# Patient Record
Sex: MALE | Race: WHITE | NOT HISPANIC OR LATINO | Employment: OTHER | URBAN - METROPOLITAN AREA
[De-identification: names, ages, dates, MRNs, and addresses within clinical notes are randomized per-mention and may not be internally consistent; named-entity substitution may affect disease eponyms.]

---

## 2017-01-03 ENCOUNTER — CLINICAL SUPPORT (OUTPATIENT)
Dept: INTERNAL MEDICINE | Facility: CLINIC | Age: 77
End: 2017-01-03
Payer: MEDICARE

## 2017-01-03 DIAGNOSIS — E78.5 HYPERLIPIDEMIA: ICD-10-CM

## 2017-01-03 DIAGNOSIS — E03.4 HYPOTHYROIDISM DUE TO ACQUIRED ATROPHY OF THYROID: ICD-10-CM

## 2017-01-03 DIAGNOSIS — E78.5 HYPERLIPIDEMIA, UNSPECIFIED HYPERLIPIDEMIA TYPE: ICD-10-CM

## 2017-01-03 LAB
ALBUMIN SERPL BCP-MCNC: 4 G/DL
ALP SERPL-CCNC: 53 U/L
ALT SERPL W/O P-5'-P-CCNC: 17 U/L
ANION GAP SERPL CALC-SCNC: 7 MMOL/L
AST SERPL-CCNC: 24 U/L
BASOPHILS # BLD AUTO: 0.03 K/UL
BASOPHILS NFR BLD: 0.5 %
BILIRUB SERPL-MCNC: 0.8 MG/DL
BUN SERPL-MCNC: 15 MG/DL
CALCIUM SERPL-MCNC: 9.2 MG/DL
CHLORIDE SERPL-SCNC: 107 MMOL/L
CHOLEST/HDLC SERPL: 6.2 {RATIO}
CO2 SERPL-SCNC: 26 MMOL/L
CREAT SERPL-MCNC: 1.1 MG/DL
DIFFERENTIAL METHOD: NORMAL
EOSINOPHIL # BLD AUTO: 0.2 K/UL
EOSINOPHIL NFR BLD: 3.5 %
ERYTHROCYTE [DISTWIDTH] IN BLOOD BY AUTOMATED COUNT: 14.3 %
EST. GFR  (AFRICAN AMERICAN): >60 ML/MIN/1.73 M^2
EST. GFR  (NON AFRICAN AMERICAN): >60 ML/MIN/1.73 M^2
GLUCOSE SERPL-MCNC: 102 MG/DL
HCT VFR BLD AUTO: 47.4 %
HDL/CHOLESTEROL RATIO: 16.2 %
HDLC SERPL-MCNC: 235 MG/DL
HDLC SERPL-MCNC: 38 MG/DL
HGB BLD-MCNC: 16.5 G/DL
LDLC SERPL CALC-MCNC: 159.2 MG/DL
LYMPHOCYTES # BLD AUTO: 2.1 K/UL
LYMPHOCYTES NFR BLD: 34.8 %
MCH RBC QN AUTO: 29.4 PG
MCHC RBC AUTO-ENTMCNC: 34.8 %
MCV RBC AUTO: 85 FL
MONOCYTES # BLD AUTO: 0.5 K/UL
MONOCYTES NFR BLD: 7.9 %
NEUTROPHILS # BLD AUTO: 3.2 K/UL
NEUTROPHILS NFR BLD: 53.3 %
NONHDLC SERPL-MCNC: 197 MG/DL
PLATELET # BLD AUTO: 229 K/UL
PMV BLD AUTO: 9.2 FL
POTASSIUM SERPL-SCNC: 4.5 MMOL/L
PROT SERPL-MCNC: 7.8 G/DL
RBC # BLD AUTO: 5.61 M/UL
SODIUM SERPL-SCNC: 140 MMOL/L
T4 FREE SERPL-MCNC: 0.85 NG/DL
TRIGL SERPL-MCNC: 189 MG/DL
TSH SERPL DL<=0.005 MIU/L-ACNC: 5.47 UIU/ML
WBC # BLD AUTO: 5.95 K/UL

## 2017-01-03 PROCEDURE — 85025 COMPLETE CBC W/AUTO DIFF WBC: CPT

## 2017-01-03 PROCEDURE — 84439 ASSAY OF FREE THYROXINE: CPT

## 2017-01-03 PROCEDURE — 80061 LIPID PANEL: CPT

## 2017-01-03 PROCEDURE — 80053 COMPREHEN METABOLIC PANEL: CPT

## 2017-01-03 PROCEDURE — 36415 COLL VENOUS BLD VENIPUNCTURE: CPT | Mod: PBBFAC

## 2017-01-03 PROCEDURE — 84443 ASSAY THYROID STIM HORMONE: CPT

## 2017-01-10 ENCOUNTER — OFFICE VISIT (OUTPATIENT)
Dept: INTERNAL MEDICINE | Facility: CLINIC | Age: 77
End: 2017-01-10
Payer: MEDICARE

## 2017-01-10 VITALS
SYSTOLIC BLOOD PRESSURE: 124 MMHG | OXYGEN SATURATION: 95 % | RESPIRATION RATE: 14 BRPM | WEIGHT: 145.06 LBS | HEART RATE: 65 BPM | DIASTOLIC BLOOD PRESSURE: 62 MMHG | BODY MASS INDEX: 21.99 KG/M2 | HEIGHT: 68 IN

## 2017-01-10 DIAGNOSIS — E78.5 HYPERLIPIDEMIA, UNSPECIFIED HYPERLIPIDEMIA TYPE: ICD-10-CM

## 2017-01-10 DIAGNOSIS — E03.4 HYPOTHYROIDISM DUE TO ACQUIRED ATROPHY OF THYROID: Primary | ICD-10-CM

## 2017-01-10 DIAGNOSIS — Z12.5 SPECIAL SCREENING FOR MALIGNANT NEOPLASM OF PROSTATE: ICD-10-CM

## 2017-01-10 DIAGNOSIS — Z29.9 PREVENTIVE MEASURE: ICD-10-CM

## 2017-01-10 PROCEDURE — 90670 PCV13 VACCINE IM: CPT | Mod: PBBFAC | Performed by: INTERNAL MEDICINE

## 2017-01-10 PROCEDURE — 99214 OFFICE O/P EST MOD 30 MIN: CPT | Mod: S$PBB,,, | Performed by: INTERNAL MEDICINE

## 2017-01-10 PROCEDURE — 99999 PR PBB SHADOW E&M-EST. PATIENT-LVL III: CPT | Mod: PBBFAC,,, | Performed by: INTERNAL MEDICINE

## 2017-01-10 PROCEDURE — 99213 OFFICE O/P EST LOW 20 MIN: CPT | Mod: PBBFAC | Performed by: INTERNAL MEDICINE

## 2017-01-10 RX ORDER — LEVOTHYROXINE SODIUM 50 UG/1
50 TABLET ORAL DAILY
Qty: 30 TABLET | Refills: 11 | Status: SHIPPED | OUTPATIENT
Start: 2017-01-10 | End: 2017-07-12 | Stop reason: SDUPTHER

## 2017-01-10 NOTE — MR AVS SNAPSHOT
[place] [facility]  [address] [facility] [address]  Phone: [phone]  Fax: [phone]                  Pawan Cantrell   1/10/2017 9:30 AM   Office Visit    Description:  Male : 1940   Provider:  Macrina Ward MD   Department:  [facility]           Reason for Visit     Thyroid Problem     Hyperlipidemia           Diagnoses this Visit        Comments    Hypothyroidism due to acquired atrophy of thyroid    -  Primary     Hyperlipidemia, unspecified hyperlipidemia type         Special screening for malignant neoplasm of prostate         Preventive measure                To Do List           Goals (5 Years of Data)     None      Follow-Up and Disposition     Return in about 6 months (around 7/10/2017).       These Medications        Disp Refills Start End    levothyroxine (SYNTHROID) 50 MCG tablet 30 tablet 11 1/10/2017 1/10/2018    Take 1 tablet (50 mcg total) by mouth once daily. - Oral    Pharmacy: [facility] [address] [place] [address] AT [address] #: [phone]         [facility] On Call     [facility] On Call Nurse Care Line -  Assistance  Registered nurses in the [facility] On Call Center provide clinical advisement, health education, appointment booking, and other advisory services.  Call for this free service at [phone].             Medications           Message regarding Medications     Verify the changes and/or additions to your medication regime listed below are the same as discussed with your clinician today.  If any of these changes or additions are incorrect, please notify your healthcare provider.        START taking these NEW medications        Refills    levothyroxine (SYNTHROID) 50 MCG tablet 11    Sig: Take 1 tablet (50 mcg total) by mouth once daily.    Class: Normal    Route: Oral           Verify that the below list of medications is an accurate representation of the medications you are  "currently taking.  If none reported, the list may be blank. If incorrect, please contact your healthcare provider. Carry this list with you in case of emergency.           Current Medications     levothyroxine (SYNTHROID) 50 MCG tablet Take 1 tablet (50 mcg total) by mouth once daily.           Clinical Reference Information           Vital Signs - Last Recorded  Most recent update: 1/10/2017 10:02 AM by Jennifer Gaona MA    BP Pulse Resp Ht Wt SpO2    124/62 65 14 5' 8" (1.727 m) 65.8 kg (145 lb 1 oz) 95%    BMI                22.06 kg/m2          Blood Pressure          Most Recent Value    BP  124/62      Allergies as of 1/10/2017     No Known Allergies      Immunizations Administered on Date of Encounter - 1/10/2017     Name Date Dose VIS Date Route    Pneumococcal Conjugate - 13 Valent  Incomplete 0.5 mL 11/5/2015 Intramuscular      Orders Placed During Today's Visit      Normal Orders This Visit    Pneumococcal Conjugate Vaccine (13 Valent) (IM)     Future Labs/Procedures Expected by Expires    TSH  2/21/2017 7/9/2017    CBC auto differential  7/9/2017 (Approximate) 1/10/2018    Comprehensive metabolic panel  7/9/2017 (Approximate) 1/10/2018    Lipid panel  7/9/2017 (Approximate) 1/10/2018    PSA, Screening  7/9/2017 (Approximate) 1/10/2018    TSH  7/9/2017 (Approximate) 1/10/2018      "

## 2017-01-10 NOTE — PROGRESS NOTES
"Subjective:       Patient ID: Pawan Cantrell is a 76 y.o. male.    Chief Complaint: Thyroid Problem (follow up with lab review) and Hyperlipidemia    Thyroid Problem   Presents for follow-up visit. Symptoms include fatigue and weight gain. Patient reports no anxiety, constipation, depressed mood, diaphoresis, diarrhea, heat intolerance, hoarse voice, leg swelling, palpitations or tremors. The symptoms have been improving. His past medical history is significant for hyperlipidemia.   Hyperlipidemia   This is a chronic problem. The current episode started more than 1 year ago. The problem is uncontrolled. Recent lipid tests were reviewed and are high. Pertinent negatives include no chest pain or shortness of breath. He is currently on no antihyperlipidemic treatment (i wanted him to take meds; " i rather do it with diet "). The current treatment provides no improvement of lipids.     Review of Systems   Constitutional: Positive for fatigue and weight gain. Negative for chills, diaphoresis and fever.   HENT: Negative for congestion, hoarse voice, postnasal drip and sore throat.    Eyes: Negative for photophobia.   Respiratory: Negative for chest tightness and shortness of breath.    Cardiovascular: Negative for chest pain and palpitations.   Gastrointestinal: Negative for abdominal distention, abdominal pain, blood in stool, constipation, diarrhea and vomiting.   Endocrine: Negative for heat intolerance.   Genitourinary: Negative for dysuria, flank pain and hematuria.   Musculoskeletal: Negative for back pain.   Skin: Negative for pallor.   Neurological: Negative for dizziness, tremors, seizures, facial asymmetry, speech difficulty and numbness.   Hematological: Does not bruise/bleed easily.   Psychiatric/Behavioral: Negative for agitation and suicidal ideas. The patient is not nervous/anxious.        Objective:      Physical Exam   Constitutional: He is oriented to person, place, and time. He appears well-developed " and well-nourished.   HENT:   Head: Normocephalic and atraumatic.   Neck: No JVD present.   Cardiovascular: Normal rate, regular rhythm and normal heart sounds.    Pulmonary/Chest: Effort normal and breath sounds normal.   Abdominal: Soft. Bowel sounds are normal. There is no tenderness.   Musculoskeletal: He exhibits no edema.   Neurological: He is alert and oriented to person, place, and time.   Skin: Skin is warm and dry.   Psychiatric: He has a normal mood and affect. His behavior is normal. Judgment and thought content normal.   Nursing note and vitals reviewed.      Assessment:       1. Hypothyroidism due to acquired atrophy of thyroid    2. Hyperlipidemia, unspecified hyperlipidemia type    3. Special screening for malignant neoplasm of prostate    4. Preventive measure        Plan:   Pawan was seen today for thyroid problem and hyperlipidemia.    Diagnoses and all orders for this visit:    Hypothyroidism due to acquired atrophy of thyroid  -     CBC auto differential; Future  -     Comprehensive metabolic panel; Future  -     TSH; Future  -     levothyroxine (SYNTHROID) 50 MCG tablet; Take 1 tablet (50 mcg total) by mouth once daily.  Under corrected  Increase meds to 50 mics po daily   Check TSH in 6 weeks    Hyperlipidemia, unspecified hyperlipidemia type  -     Lipid panel; Future  -     TSH; Future  -     TSH; Future  Offered him statins ; not ready for that     Limit the cholesterol in your diet to less than 300 mg per day.   Fats should contribute no more than 20 to 35% of your daily calories.   Less than 7 to 10% of your calories should come from saturated fat.   Avoid saturated fat products e.g., Butter, some oils, meat, and poultry fat contain a lot of saturated fat.   Check food labels for fat and cholesterol content. Choose the foods with less fat per serving.   Limit the amount of butter and margarine you eat.   Use salad dressings and margarine made with polyunsaturated and monounsaturated  fats.   Use egg whites or egg substitutes rather than whole eggs.   Replace whole-milk dairy products with nonfat or low-fat milk, cheese, spreads, and yogurt.   Eat skinless chicken, turkey, fish, and meatless entrees more often than red meat.   Choose lean cuts of meat and trim off all visible fat. Keep portion sizes moderate.   Avoid fatty desserts such as ice cream, cream-filled cakes, and cheesecakes. Choose fresh fruits, nonfat frozen yogurt, Popsicles, etc.   Reduce the amount of fried foods, vending machine food, and fast food you eat.   Eat fruits and vegetables (especially fresh fruits and leafy vegetables), beans, and whole grains daily. The fiber in these foods helps lower cholesterol.   Look for low-fat or nonfat varieties of the foods you like to eat, or look for substitutes.   You may need to exercise 60 minutes a day to prevent weight gain and 90 minutes a day to lose weight.      Special screening for malignant neoplasm of prostate  -     PSA, Screening; Future    Preventive measure  -     Pneumococcal Conjugate Vaccine (13 Valent) (IM)

## 2017-02-23 ENCOUNTER — CLINICAL SUPPORT (OUTPATIENT)
Dept: INTERNAL MEDICINE | Facility: CLINIC | Age: 77
End: 2017-02-23
Payer: MEDICARE

## 2017-02-23 DIAGNOSIS — E03.4 HYPOTHYROIDISM DUE TO ACQUIRED ATROPHY OF THYROID: ICD-10-CM

## 2017-02-23 DIAGNOSIS — E78.5 HYPERLIPIDEMIA, UNSPECIFIED HYPERLIPIDEMIA TYPE: ICD-10-CM

## 2017-02-23 LAB — TSH SERPL DL<=0.005 MIU/L-ACNC: 1.6 UIU/ML

## 2017-02-23 PROCEDURE — 84443 ASSAY THYROID STIM HORMONE: CPT

## 2017-02-23 PROCEDURE — 36415 COLL VENOUS BLD VENIPUNCTURE: CPT | Mod: PBBFAC

## 2017-07-05 ENCOUNTER — LAB VISIT (OUTPATIENT)
Dept: LAB | Facility: HOSPITAL | Age: 77
End: 2017-07-05
Attending: INTERNAL MEDICINE
Payer: MEDICARE

## 2017-07-05 DIAGNOSIS — E03.4 HYPOTHYROIDISM DUE TO ACQUIRED ATROPHY OF THYROID: ICD-10-CM

## 2017-07-05 DIAGNOSIS — Z12.5 SPECIAL SCREENING FOR MALIGNANT NEOPLASM OF PROSTATE: ICD-10-CM

## 2017-07-05 DIAGNOSIS — E78.5 HYPERLIPIDEMIA, UNSPECIFIED HYPERLIPIDEMIA TYPE: ICD-10-CM

## 2017-07-05 LAB
ALBUMIN SERPL BCP-MCNC: 3.9 G/DL
ALP SERPL-CCNC: 54 U/L
ALT SERPL W/O P-5'-P-CCNC: 19 U/L
ANION GAP SERPL CALC-SCNC: 8 MMOL/L
AST SERPL-CCNC: 23 U/L
BASOPHILS # BLD AUTO: 0.04 K/UL
BASOPHILS NFR BLD: 0.6 %
BILIRUB SERPL-MCNC: 0.9 MG/DL
BUN SERPL-MCNC: 16 MG/DL
CALCIUM SERPL-MCNC: 9.5 MG/DL
CHLORIDE SERPL-SCNC: 107 MMOL/L
CHOLEST/HDLC SERPL: 5.8 {RATIO}
CO2 SERPL-SCNC: 27 MMOL/L
COMPLEXED PSA SERPL-MCNC: 3.6 NG/ML
CREAT SERPL-MCNC: 1.1 MG/DL
DIFFERENTIAL METHOD: ABNORMAL
EOSINOPHIL # BLD AUTO: 0.1 K/UL
EOSINOPHIL NFR BLD: 2.2 %
ERYTHROCYTE [DISTWIDTH] IN BLOOD BY AUTOMATED COUNT: 14.5 %
EST. GFR  (AFRICAN AMERICAN): >60 ML/MIN/1.73 M^2
EST. GFR  (NON AFRICAN AMERICAN): >60 ML/MIN/1.73 M^2
GLUCOSE SERPL-MCNC: 101 MG/DL
HCT VFR BLD AUTO: 49.2 %
HDL/CHOLESTEROL RATIO: 17.3 %
HDLC SERPL-MCNC: 220 MG/DL
HDLC SERPL-MCNC: 38 MG/DL
HGB BLD-MCNC: 16.8 G/DL
LDLC SERPL CALC-MCNC: 144.6 MG/DL
LYMPHOCYTES # BLD AUTO: 2.1 K/UL
LYMPHOCYTES NFR BLD: 34 %
MCH RBC QN AUTO: 29 PG
MCHC RBC AUTO-ENTMCNC: 34.1 %
MCV RBC AUTO: 85 FL
MONOCYTES # BLD AUTO: 0.5 K/UL
MONOCYTES NFR BLD: 8.6 %
NEUTROPHILS # BLD AUTO: 3.4 K/UL
NEUTROPHILS NFR BLD: 54.6 %
NONHDLC SERPL-MCNC: 182 MG/DL
PLATELET # BLD AUTO: 225 K/UL
PMV BLD AUTO: 8.6 FL
POTASSIUM SERPL-SCNC: 4.4 MMOL/L
PROT SERPL-MCNC: 7.8 G/DL
RBC # BLD AUTO: 5.79 M/UL
SODIUM SERPL-SCNC: 142 MMOL/L
TRIGL SERPL-MCNC: 187 MG/DL
TSH SERPL DL<=0.005 MIU/L-ACNC: 0.5 UIU/ML
WBC # BLD AUTO: 6.3 K/UL

## 2017-07-05 PROCEDURE — 84153 ASSAY OF PSA TOTAL: CPT

## 2017-07-05 PROCEDURE — 36415 COLL VENOUS BLD VENIPUNCTURE: CPT

## 2017-07-05 PROCEDURE — 85025 COMPLETE CBC W/AUTO DIFF WBC: CPT

## 2017-07-05 PROCEDURE — 80053 COMPREHEN METABOLIC PANEL: CPT

## 2017-07-05 PROCEDURE — 80061 LIPID PANEL: CPT

## 2017-07-05 PROCEDURE — 84443 ASSAY THYROID STIM HORMONE: CPT

## 2017-07-12 ENCOUNTER — OFFICE VISIT (OUTPATIENT)
Dept: INTERNAL MEDICINE | Facility: CLINIC | Age: 77
End: 2017-07-12
Payer: MEDICARE

## 2017-07-12 VITALS
OXYGEN SATURATION: 94 % | BODY MASS INDEX: 21.55 KG/M2 | WEIGHT: 142.19 LBS | HEART RATE: 71 BPM | DIASTOLIC BLOOD PRESSURE: 72 MMHG | HEIGHT: 68 IN | RESPIRATION RATE: 14 BRPM | SYSTOLIC BLOOD PRESSURE: 118 MMHG

## 2017-07-12 DIAGNOSIS — E78.5 HYPERLIPIDEMIA, UNSPECIFIED HYPERLIPIDEMIA TYPE: ICD-10-CM

## 2017-07-12 DIAGNOSIS — Z29.9 PREVENTIVE MEASURE: ICD-10-CM

## 2017-07-12 DIAGNOSIS — E03.4 HYPOTHYROIDISM DUE TO ACQUIRED ATROPHY OF THYROID: Primary | ICD-10-CM

## 2017-07-12 PROCEDURE — 99999 PR PBB SHADOW E&M-EST. PATIENT-LVL III: CPT | Mod: PBBFAC,,, | Performed by: INTERNAL MEDICINE

## 2017-07-12 PROCEDURE — 99213 OFFICE O/P EST LOW 20 MIN: CPT | Mod: PBBFAC | Performed by: INTERNAL MEDICINE

## 2017-07-12 PROCEDURE — 99214 OFFICE O/P EST MOD 30 MIN: CPT | Mod: S$PBB | Performed by: INTERNAL MEDICINE

## 2017-07-12 RX ORDER — LEVOTHYROXINE SODIUM 50 UG/1
50 TABLET ORAL DAILY
Qty: 90 TABLET | Refills: 1 | Status: SHIPPED | OUTPATIENT
Start: 2017-07-12 | End: 2018-01-12 | Stop reason: SDUPTHER

## 2017-07-12 NOTE — PROGRESS NOTES
"Subjective:       Patient ID: Pawan Cantrell is a 77 y.o. male.    Chief Complaint: Hypothyroidism (follow up with lab review) and Hyperlipidemia    Hyperlipidemia   This is a chronic problem. The current episode started more than 1 year ago. The problem is uncontrolled. Recent lipid tests were reviewed and are high. Pertinent negatives include no chest pain or shortness of breath. He is currently on no antihyperlipidemic treatment (i wanted him to take meds; " i rather do it with diet "). The current treatment provides no improvement of lipids.   Thyroid Problem   Presents for follow-up visit. Symptoms include fatigue and weight gain. Patient reports no anxiety, constipation, depressed mood, diaphoresis, diarrhea, heat intolerance, hoarse voice, leg swelling, palpitations or tremors. The symptoms have been improving. His past medical history is significant for hyperlipidemia.     Review of Systems   Constitutional: Positive for fatigue and weight gain. Negative for chills, diaphoresis and fever.   HENT: Negative for congestion, hoarse voice, postnasal drip and sore throat.    Eyes: Negative for photophobia.   Respiratory: Negative for chest tightness and shortness of breath.    Cardiovascular: Negative for chest pain and palpitations.   Gastrointestinal: Negative for abdominal distention, abdominal pain, blood in stool, constipation, diarrhea and vomiting.   Endocrine: Negative for heat intolerance.   Genitourinary: Negative for dysuria, flank pain and hematuria.   Musculoskeletal: Negative for back pain.   Skin: Negative for pallor.   Neurological: Negative for dizziness, tremors, seizures, facial asymmetry, speech difficulty and numbness.   Hematological: Does not bruise/bleed easily.   Psychiatric/Behavioral: Negative for agitation and suicidal ideas. The patient is not nervous/anxious.        Objective:      Physical Exam   Constitutional: He is oriented to person, place, and time. He appears well-developed " and well-nourished.   HENT:   Head: Normocephalic and atraumatic.   Neck: No JVD present.   Cardiovascular: Normal rate, regular rhythm and normal heart sounds.    Pulmonary/Chest: Effort normal and breath sounds normal.   Abdominal: Soft. Bowel sounds are normal. There is no tenderness.   Musculoskeletal: He exhibits no edema.   Neurological: He is alert and oriented to person, place, and time.   Skin: Skin is warm and dry.   Psychiatric: He has a normal mood and affect. His behavior is normal. Judgment and thought content normal.   Nursing note and vitals reviewed.      Assessment:       1. Hypothyroidism due to acquired atrophy of thyroid    2. Hyperlipidemia, unspecified hyperlipidemia type    3. Preventive measure        Plan:   Pawan was seen today for hypothyroidism and hyperlipidemia.    Diagnoses and all orders for this visit:    Hypothyroidism due to acquired atrophy of thyroid  -     levothyroxine (SYNTHROID) 50 MCG tablet; Take 1 tablet (50 mcg total) by mouth once daily.  Well controlled.  Continue same medication and dose.\    Hyperlipidemia, unspecified hyperlipidemia type  Limit the cholesterol in your diet to less than 300 mg per day.   Fats should contribute no more than 20 to 35% of your daily calories.   Less than 7 to 10% of your calories should come from saturated fat.   Avoid saturated fat products e.g., Butter, some oils, meat, and poultry fat contain a lot of saturated fat.   Check food labels for fat and cholesterol content. Choose the foods with less fat per serving.   Limit the amount of butter and margarine you eat.   Use salad dressings and margarine made with polyunsaturated and monounsaturated fats.   Use egg whites or egg substitutes rather than whole eggs.   Replace whole-milk dairy products with nonfat or low-fat milk, cheese, spreads, and yogurt.   Eat skinless chicken, turkey, fish, and meatless entrees more often than red meat.   Choose lean cuts of meat and trim off all  visible fat. Keep portion sizes moderate.   Avoid fatty desserts such as ice cream, cream-filled cakes, and cheesecakes. Choose fresh fruits, nonfat frozen yogurt, Popsicles, etc.   Reduce the amount of fried foods, vending machine food, and fast food you eat.   Eat fruits and vegetables (especially fresh fruits and leafy vegetables), beans, and whole grains daily. The fiber in these foods helps lower cholesterol.   Look for low-fat or nonfat varieties of the foods you like to eat, or look for substitutes.   You may need to exercise 60 minutes a day to prevent weight gain and 90 minutes a day to lose weight.    Preventive measure  -     (In Office Administered) Td Vaccine

## 2017-12-26 ENCOUNTER — OFFICE VISIT (OUTPATIENT)
Dept: URGENT CARE | Facility: CLINIC | Age: 77
End: 2017-12-26
Payer: MEDICARE

## 2017-12-26 VITALS
OXYGEN SATURATION: 98 % | WEIGHT: 135 LBS | SYSTOLIC BLOOD PRESSURE: 117 MMHG | RESPIRATION RATE: 18 BRPM | BODY MASS INDEX: 20.46 KG/M2 | HEART RATE: 76 BPM | DIASTOLIC BLOOD PRESSURE: 74 MMHG | HEIGHT: 68 IN | TEMPERATURE: 98 F

## 2017-12-26 DIAGNOSIS — L23.7 POISON IVY: Primary | ICD-10-CM

## 2017-12-26 PROCEDURE — 99203 OFFICE O/P NEW LOW 30 MIN: CPT | Mod: S$GLB,,, | Performed by: INTERNAL MEDICINE

## 2017-12-26 RX ORDER — HYDROXYZINE PAMOATE 50 MG/1
50 CAPSULE ORAL EVERY 8 HOURS PRN
Qty: 25 CAPSULE | Refills: 0 | Status: SHIPPED | OUTPATIENT
Start: 2017-12-26 | End: 2018-09-05

## 2017-12-26 RX ORDER — PREDNISONE 20 MG/1
20 TABLET ORAL DAILY
Qty: 5 TABLET | Refills: 0 | Status: SHIPPED | OUTPATIENT
Start: 2017-12-26 | End: 2017-12-31

## 2017-12-26 NOTE — PATIENT INSTRUCTIONS
Poison Ivy Rash  You have a rash and itching. This is a delayed reaction to the oils of the poison ivy plant. You likely came in contact with it during the 3 days before your symptoms began. Your skin will become red and itchy. Small blisters may appear. These can break and leak a clear yellow fluid. This fluid is not contagious. The reaction usually starts to go away after 1 to 2 weeks. But it may take 4 to 6 weeks to fully clear.    Home care  Follow these guidelines when caring for yourself at home:  · The plant oils still on your skin or clothes can be spread to other places on your body. They can also be passed on to other people and cause a similar reaction. Thats why its important to wash all of the plant oils off your skin and any clothes that may have been exposed. Wash all clothes that you were wearing. Use hot water with ordinary laundry detergent.  · Don't use over-the-counter creams that have neomycin or bacitracin. These may make the rash worse.  · Avoid anything that heats up your skin. This includes hot showers or baths, or direct sunlight. These can make itching worse.  · Put a cold compress on areas that are leaking (weeping), or on blistered areas. Do this for 30 minutes 3 times a day. To make a cold compress, dip a wash cloth in a mixture of 1 pint of cold water and 1 packet of astringent or oatmeal bath powder. Keep the solution in the refrigerator for future use.  · If large areas of skin are affected, take a lukewarm bath. Add colloidal oatmeal, or 1 cup of cornstarch or baking soda to the water.  · For a rash in a smaller area, use hydrocortisone cream for redness and irritation. But dont use this if another medicine was prescribed. For severe itching, put an ice pack on the area. To make an ice pack, put ice cubes in a plastic bag that seals at the top. Wrap the bag in a clean, thin towel or cloth. Never put ice or an ice pack directly on the skin. Over-the-counter products that have  calamine lotion may also be helpful.  · You can also use an oral antihistamine medicine with diphenhydramine for itching, unless another medicine was prescribed. This medicine may make you sleepy. So use lower doses during the daytime and higher doses at bedtime. Dont use medicine that has diphenhydramine if you have glaucoma. Also dont use it if you are a man who has trouble urinating because of an enlarged prostate. Antihistamines with loratidine cause less drowsiness. They are a good choice for daytime use.  · For severe cases, your provider may prescribe oral steroid medicines. Always take these exactly as prescribed.  Follow-up care  Follow up with your healthcare provider, or as directed. Call your provider if your rash gets worse or you are not starting to get better after 1 week of treatment.  When to seek medical advice  Call your healthcare provider right away if any of these occur:  · Spreading facial rash with swollen mouth or eyelids  · Rash that spreads to the groin and causes swelling of the penis, scrotum, or vaginal area  · Trouble urinating because of swelling in the genital area  Also call your provider if you have signs of infection in the areas of broken blisters:  · Spreading redness  · Pus or fluid draining from the blisters  · Yellow-brown crusts form over the open blisters  · Fever of 1 degree, or higher, above your normal temperature, or as directed by your provider  Call 911  Call 911 if you have severe swelling on your face, eyelids, mouth, throat, or tongue.  Date Last Reviewed: 8/1/2016  © 0253-7072 [employer]. [address] [place] [address], [place] [address]. All rights reserved. This information is not intended as a substitute for professional medical care. Always follow your healthcare professional's instructions.  Please return here or go to the Emergency Department for any concerns or worsening of condition.  If you were prescribed antibiotics, please take them to  completion.  If you were prescribed a narcotic medication, do not drive or operate heavy equipment or machinery while taking these medications.  Please follow up with your primary care doctor or specialist as needed.    If you  smoke, please stop smoking.

## 2017-12-26 NOTE — PROGRESS NOTES
"Subjective:       Patient ID: Pawan Cantrell is a 77 y.o. male.    Vitals:  height is 5' 8" (1.727 m) and weight is 61.2 kg (135 lb). His oral temperature is 98 °F (36.7 °C). His blood pressure is 117/74 and his pulse is 76. His respiration is 18 and oxygen saturation is 98%.     Chief Complaint: Rash    This is a 77 y.o. male with Past Medical History:  No date: Hyperlipidemia  No date: Hyperthyroidism   who presents today with a chief complaint of rash.      Rash   This is a new problem. The current episode started in the past 7 days. The problem has been gradually worsening since onset. The affected locations include the neck and back. The rash is characterized by dryness, itchiness and redness. He was exposed to nothing. Pertinent negatives include no fever, joint pain, shortness of breath or sore throat. Past treatments include anti-itch cream. The treatment provided mild relief.     Review of Systems   Constitution: Negative for chills and fever.   HENT: Negative for sore throat.    Respiratory: Negative for shortness of breath.    Skin: Positive for itching and rash.   Musculoskeletal: Negative for joint pain.       Objective:      Physical Exam   Constitutional: He is oriented to person, place, and time. He appears well-developed and well-nourished. He is cooperative.  Non-toxic appearance. He does not appear ill. No distress.   HENT:   Head: Normocephalic and atraumatic.   Right Ear: Hearing, tympanic membrane, external ear and ear canal normal.   Left Ear: Hearing, tympanic membrane, external ear and ear canal normal.   Nose: Nose normal. No mucosal edema, rhinorrhea or nasal deformity. No epistaxis. Right sinus exhibits no maxillary sinus tenderness and no frontal sinus tenderness. Left sinus exhibits no maxillary sinus tenderness and no frontal sinus tenderness.   Mouth/Throat: Uvula is midline, oropharynx is clear and moist and mucous membranes are normal. No trismus in the jaw. Normal dentition. No " uvula swelling. No posterior oropharyngeal erythema.   Eyes: Conjunctivae and lids are normal. Right eye exhibits no discharge. Left eye exhibits no discharge. No scleral icterus.   Sclera clear bilat   Neck: Trachea normal, normal range of motion, full passive range of motion without pain and phonation normal. Neck supple.   Cardiovascular: Normal rate, regular rhythm, normal heart sounds, intact distal pulses and normal pulses.    Pulmonary/Chest: Effort normal and breath sounds normal. No respiratory distress.   Abdominal: Soft. Normal appearance and bowel sounds are normal. He exhibits no distension, no pulsatile midline mass and no mass. There is no tenderness.   Musculoskeletal: Normal range of motion. He exhibits no edema or deformity.   Neurological: He is alert and oriented to person, place, and time. He exhibits normal muscle tone. Coordination normal.   Skin: Skin is warm, dry and intact. Rash noted. Rash is macular and papular. He is not diaphoretic. No pallor.        Psychiatric: He has a normal mood and affect. His speech is normal and behavior is normal. Judgment and thought content normal. Cognition and memory are normal.   Nursing note and vitals reviewed.      Assessment:       1. Poison ivy        Plan:         Poison ivy  -     predniSONE (DELTASONE) 20 MG tablet; Take 1 tablet (20 mg total) by mouth once daily.  Dispense: 5 tablet; Refill: 0  -     hydrOXYzine pamoate (VISTARIL) 50 MG Cap; Take 1 capsule (50 mg total) by mouth every 8 (eight) hours as needed.  Dispense: 25 capsule; Refill: 0      Take meds

## 2017-12-29 ENCOUNTER — TELEPHONE (OUTPATIENT)
Dept: URGENT CARE | Facility: CLINIC | Age: 77
End: 2017-12-29

## 2018-01-12 DIAGNOSIS — E03.4 HYPOTHYROIDISM DUE TO ACQUIRED ATROPHY OF THYROID: ICD-10-CM

## 2018-01-14 DIAGNOSIS — E03.4 HYPOTHYROIDISM DUE TO ACQUIRED ATROPHY OF THYROID: ICD-10-CM

## 2018-01-15 RX ORDER — LEVOTHYROXINE SODIUM 50 UG/1
50 TABLET ORAL DAILY
Qty: 90 TABLET | Refills: 1 | Status: SHIPPED | OUTPATIENT
Start: 2018-01-15 | End: 2018-08-13 | Stop reason: SDUPTHER

## 2018-01-15 RX ORDER — LEVOTHYROXINE SODIUM 50 UG/1
TABLET ORAL
Qty: 30 TABLET | Refills: 0 | Status: SHIPPED | OUTPATIENT
Start: 2018-01-15 | End: 2018-08-13 | Stop reason: SDUPTHER

## 2018-08-13 DIAGNOSIS — E03.4 HYPOTHYROIDISM DUE TO ACQUIRED ATROPHY OF THYROID: ICD-10-CM

## 2018-08-13 DIAGNOSIS — E78.5 HYPERLIPIDEMIA, UNSPECIFIED HYPERLIPIDEMIA TYPE: ICD-10-CM

## 2018-08-13 DIAGNOSIS — E03.9 HYPOTHYROIDISM (ACQUIRED): Primary | ICD-10-CM

## 2018-08-13 DIAGNOSIS — Z12.5 ENCOUNTER FOR PROSTATE CANCER SCREENING: ICD-10-CM

## 2018-08-13 RX ORDER — LEVOTHYROXINE SODIUM 50 UG/1
TABLET ORAL
Qty: 30 TABLET | Refills: 0 | Status: SHIPPED | OUTPATIENT
Start: 2018-08-13 | End: 2018-09-05 | Stop reason: SDUPTHER

## 2018-08-13 NOTE — TELEPHONE ENCOUNTER
----- Message from Roland Geiger sent at 2018 10:35 AM CDT -----  Contact: KARELY / SISTER  Pawan Cantrell  MRN: [id number]  : 1940  PCP: Macrina Ward  Home Phone      [phone]  Work Phone      Not on file.  Mobile          Not on file.      MESSAGE: NEEDS REFILL ON LEVOTHYROXINE    PHONE: [phone]    PHARMACY: [facility]

## 2018-08-13 NOTE — TELEPHONE ENCOUNTER
Requested Prescriptions     Pending Prescriptions Disp Refills    levothyroxine (SYNTHROID) 50 MCG tablet 30 tablet 0     Sig: TAKE 1 TABLET(50 MCG) BY MOUTH EVERY DAY   30 day med refill pended with 0 refills. Patient has not been seen in clinic in >1 year. Appt scheduled for 9/5/18. Lab appt scheduled for 8/21/18

## 2018-08-21 ENCOUNTER — CLINICAL SUPPORT (OUTPATIENT)
Dept: INTERNAL MEDICINE | Facility: CLINIC | Age: 78
End: 2018-08-21
Payer: MEDICARE

## 2018-08-21 DIAGNOSIS — E78.5 HYPERLIPIDEMIA, UNSPECIFIED HYPERLIPIDEMIA TYPE: ICD-10-CM

## 2018-08-21 DIAGNOSIS — Z12.5 ENCOUNTER FOR PROSTATE CANCER SCREENING: ICD-10-CM

## 2018-08-21 DIAGNOSIS — E03.9 HYPOTHYROIDISM (ACQUIRED): ICD-10-CM

## 2018-08-21 LAB
ALBUMIN SERPL BCP-MCNC: 3.9 G/DL
ALP SERPL-CCNC: 53 U/L
ALT SERPL W/O P-5'-P-CCNC: 13 U/L
ANION GAP SERPL CALC-SCNC: 7 MMOL/L
AST SERPL-CCNC: 23 U/L
BASOPHILS # BLD AUTO: 0.03 K/UL
BASOPHILS NFR BLD: 0.5 %
BILIRUB SERPL-MCNC: 0.7 MG/DL
BUN SERPL-MCNC: 15 MG/DL
CALCIUM SERPL-MCNC: 9.4 MG/DL
CHLORIDE SERPL-SCNC: 104 MMOL/L
CHOLEST SERPL-MCNC: 227 MG/DL
CHOLEST/HDLC SERPL: 5.8 {RATIO}
CO2 SERPL-SCNC: 28 MMOL/L
COMPLEXED PSA SERPL-MCNC: 4 NG/ML
CREAT SERPL-MCNC: 1.1 MG/DL
DIFFERENTIAL METHOD: ABNORMAL
EOSINOPHIL # BLD AUTO: 0.2 K/UL
EOSINOPHIL NFR BLD: 2.5 %
ERYTHROCYTE [DISTWIDTH] IN BLOOD BY AUTOMATED COUNT: 14.4 %
EST. GFR  (AFRICAN AMERICAN): >60 ML/MIN/1.73 M^2
EST. GFR  (NON AFRICAN AMERICAN): >60 ML/MIN/1.73 M^2
GLUCOSE SERPL-MCNC: 103 MG/DL
HCT VFR BLD AUTO: 49.3 %
HDLC SERPL-MCNC: 39 MG/DL
HDLC SERPL: 17.2 %
HGB BLD-MCNC: 17 G/DL
LDLC SERPL CALC-MCNC: 145.8 MG/DL
LYMPHOCYTES # BLD AUTO: 1.7 K/UL
LYMPHOCYTES NFR BLD: 26.4 %
MCH RBC QN AUTO: 29.6 PG
MCHC RBC AUTO-ENTMCNC: 34.5 G/DL
MCV RBC AUTO: 86 FL
MONOCYTES # BLD AUTO: 0.5 K/UL
MONOCYTES NFR BLD: 8.1 %
NEUTROPHILS # BLD AUTO: 3.9 K/UL
NEUTROPHILS NFR BLD: 62.5 %
NONHDLC SERPL-MCNC: 188 MG/DL
PLATELET # BLD AUTO: 215 K/UL
PMV BLD AUTO: 9 FL
POTASSIUM SERPL-SCNC: 4.6 MMOL/L
PROT SERPL-MCNC: 7.6 G/DL
RBC # BLD AUTO: 5.74 M/UL
SODIUM SERPL-SCNC: 139 MMOL/L
TRIGL SERPL-MCNC: 211 MG/DL
TSH SERPL DL<=0.005 MIU/L-ACNC: 1.93 UIU/ML
WBC # BLD AUTO: 6.29 K/UL

## 2018-08-21 PROCEDURE — 84153 ASSAY OF PSA TOTAL: CPT

## 2018-08-21 PROCEDURE — 99211 OFF/OP EST MAY X REQ PHY/QHP: CPT | Mod: PBBFAC

## 2018-08-21 PROCEDURE — 36415 COLL VENOUS BLD VENIPUNCTURE: CPT | Mod: PBBFAC

## 2018-08-21 PROCEDURE — 80061 LIPID PANEL: CPT

## 2018-08-21 PROCEDURE — 84443 ASSAY THYROID STIM HORMONE: CPT

## 2018-08-21 PROCEDURE — 99999 PR PBB SHADOW E&M-EST. PATIENT-LVL I: CPT | Mod: PBBFAC,,,

## 2018-08-21 PROCEDURE — 99999 PR STA SHADOW: CPT | Mod: PBBFAC,,,

## 2018-08-21 PROCEDURE — 85025 COMPLETE CBC W/AUTO DIFF WBC: CPT

## 2018-08-21 PROCEDURE — 80053 COMPREHEN METABOLIC PANEL: CPT

## 2018-09-05 ENCOUNTER — OFFICE VISIT (OUTPATIENT)
Dept: INTERNAL MEDICINE | Facility: CLINIC | Age: 78
End: 2018-09-05
Payer: MEDICARE

## 2018-09-05 VITALS
OXYGEN SATURATION: 100 % | BODY MASS INDEX: 20.75 KG/M2 | HEART RATE: 64 BPM | DIASTOLIC BLOOD PRESSURE: 70 MMHG | HEIGHT: 68 IN | RESPIRATION RATE: 16 BRPM | SYSTOLIC BLOOD PRESSURE: 116 MMHG | WEIGHT: 136.88 LBS

## 2018-09-05 DIAGNOSIS — E03.4 HYPOTHYROIDISM DUE TO ACQUIRED ATROPHY OF THYROID: Primary | ICD-10-CM

## 2018-09-05 DIAGNOSIS — E78.5 HYPERLIPIDEMIA, UNSPECIFIED HYPERLIPIDEMIA TYPE: ICD-10-CM

## 2018-09-05 DIAGNOSIS — Z23 IMMUNIZATION DUE: ICD-10-CM

## 2018-09-05 PROCEDURE — 99999 PR STA SHADOW: CPT | Mod: PBBFAC,,, | Performed by: INTERNAL MEDICINE

## 2018-09-05 PROCEDURE — 99999 PNEUMOCOCCAL POLYSACCHARIDE VACCINE 23-VALENT =>2YO SQ IM: CPT | Mod: PBBFAC,,,

## 2018-09-05 PROCEDURE — 90732 PPSV23 VACC 2 YRS+ SUBQ/IM: CPT | Mod: PBBFAC

## 2018-09-05 PROCEDURE — 99999 PR PBB SHADOW E&M-EST. PATIENT-LVL III: CPT | Mod: PBBFAC,,, | Performed by: INTERNAL MEDICINE

## 2018-09-05 PROCEDURE — 99213 OFFICE O/P EST LOW 20 MIN: CPT | Mod: PBBFAC,25 | Performed by: INTERNAL MEDICINE

## 2018-09-05 PROCEDURE — 99999 TD VACCINE GREATER THAN OR EQUAL TO 7YO PRESERVATIVE FREE IM: CPT | Mod: PBBFAC,,,

## 2018-09-05 PROCEDURE — 99214 OFFICE O/P EST MOD 30 MIN: CPT | Mod: S$PBB | Performed by: INTERNAL MEDICINE

## 2018-09-05 PROCEDURE — 90714 TD VACC NO PRESV 7 YRS+ IM: CPT | Mod: PBBFAC

## 2018-09-05 RX ORDER — LEVOTHYROXINE SODIUM 50 UG/1
TABLET ORAL
Qty: 90 TABLET | Refills: 3 | Status: SHIPPED | OUTPATIENT
Start: 2018-09-05 | End: 2019-03-13 | Stop reason: SDUPTHER

## 2019-03-06 ENCOUNTER — CLINICAL SUPPORT (OUTPATIENT)
Dept: INTERNAL MEDICINE | Facility: CLINIC | Age: 79
End: 2019-03-06
Payer: MEDICARE

## 2019-03-06 DIAGNOSIS — E78.5 HYPERLIPIDEMIA, UNSPECIFIED HYPERLIPIDEMIA TYPE: ICD-10-CM

## 2019-03-06 DIAGNOSIS — E03.4 HYPOTHYROIDISM DUE TO ACQUIRED ATROPHY OF THYROID: ICD-10-CM

## 2019-03-06 LAB
ALBUMIN SERPL BCP-MCNC: 3.9 G/DL
ALP SERPL-CCNC: 58 U/L
ALT SERPL W/O P-5'-P-CCNC: 16 U/L
ANION GAP SERPL CALC-SCNC: 7 MMOL/L
AST SERPL-CCNC: 26 U/L
BASOPHILS # BLD AUTO: 0.04 K/UL
BASOPHILS NFR BLD: 0.6 %
BILIRUB SERPL-MCNC: 0.7 MG/DL
BUN SERPL-MCNC: 16 MG/DL
CALCIUM SERPL-MCNC: 9.8 MG/DL
CHLORIDE SERPL-SCNC: 104 MMOL/L
CHOLEST SERPL-MCNC: 240 MG/DL
CHOLEST/HDLC SERPL: 5.9 {RATIO}
CO2 SERPL-SCNC: 28 MMOL/L
CREAT SERPL-MCNC: 1.1 MG/DL
DIFFERENTIAL METHOD: NORMAL
EOSINOPHIL # BLD AUTO: 0.2 K/UL
EOSINOPHIL NFR BLD: 3.3 %
ERYTHROCYTE [DISTWIDTH] IN BLOOD BY AUTOMATED COUNT: 14.1 %
EST. GFR  (AFRICAN AMERICAN): >60 ML/MIN/1.73 M^2
EST. GFR  (NON AFRICAN AMERICAN): >60 ML/MIN/1.73 M^2
GLUCOSE SERPL-MCNC: 106 MG/DL
HCT VFR BLD AUTO: 48.8 %
HDLC SERPL-MCNC: 41 MG/DL
HDLC SERPL: 17.1 %
HGB BLD-MCNC: 16.8 G/DL
LDLC SERPL CALC-MCNC: 155 MG/DL
LYMPHOCYTES # BLD AUTO: 1.8 K/UL
LYMPHOCYTES NFR BLD: 28.5 %
MCH RBC QN AUTO: 29.6 PG
MCHC RBC AUTO-ENTMCNC: 34.4 G/DL
MCV RBC AUTO: 86 FL
MONOCYTES # BLD AUTO: 0.5 K/UL
MONOCYTES NFR BLD: 8.2 %
NEUTROPHILS # BLD AUTO: 3.8 K/UL
NEUTROPHILS NFR BLD: 59.4 %
NONHDLC SERPL-MCNC: 199 MG/DL
PLATELET # BLD AUTO: 241 K/UL
PMV BLD AUTO: 9.3 FL
POTASSIUM SERPL-SCNC: 4.7 MMOL/L
PROT SERPL-MCNC: 7.7 G/DL
RBC # BLD AUTO: 5.67 M/UL
SODIUM SERPL-SCNC: 139 MMOL/L
TRIGL SERPL-MCNC: 220 MG/DL
TSH SERPL DL<=0.005 MIU/L-ACNC: 1.69 UIU/ML
WBC # BLD AUTO: 6.35 K/UL

## 2019-03-06 PROCEDURE — 99999 PR STA SHADOW: CPT | Mod: PBBFAC,,,

## 2019-03-06 PROCEDURE — 84443 ASSAY THYROID STIM HORMONE: CPT

## 2019-03-06 PROCEDURE — 36415 COLL VENOUS BLD VENIPUNCTURE: CPT | Mod: PBBFAC

## 2019-03-06 PROCEDURE — 99999 PR PBB SHADOW E&M-EST. PATIENT-LVL I: CPT | Mod: PBBFAC,,,

## 2019-03-06 PROCEDURE — 99999 PR STA SHADOW: ICD-10-PCS | Mod: PBBFAC,,,

## 2019-03-06 PROCEDURE — 80053 COMPREHEN METABOLIC PANEL: CPT

## 2019-03-06 PROCEDURE — 80061 LIPID PANEL: CPT

## 2019-03-06 PROCEDURE — 99211 OFF/OP EST MAY X REQ PHY/QHP: CPT | Mod: PBBFAC

## 2019-03-06 PROCEDURE — 85025 COMPLETE CBC W/AUTO DIFF WBC: CPT

## 2019-03-06 NOTE — PROGRESS NOTES
Venipuncture Collected.     Number of Sticks: 1  Site #1: Left Antecubital  Site #2: N/A  Site #3: N/A    Complications? Other  Additional Comments:

## 2019-03-13 ENCOUNTER — OFFICE VISIT (OUTPATIENT)
Dept: INTERNAL MEDICINE | Facility: CLINIC | Age: 79
End: 2019-03-13
Payer: MEDICARE

## 2019-03-13 VITALS
DIASTOLIC BLOOD PRESSURE: 72 MMHG | RESPIRATION RATE: 16 BRPM | HEIGHT: 68 IN | HEART RATE: 70 BPM | OXYGEN SATURATION: 98 % | SYSTOLIC BLOOD PRESSURE: 128 MMHG | BODY MASS INDEX: 21.31 KG/M2 | WEIGHT: 140.63 LBS

## 2019-03-13 DIAGNOSIS — E78.2 MIXED HYPERLIPIDEMIA: Primary | ICD-10-CM

## 2019-03-13 DIAGNOSIS — Z12.5 SCREENING FOR PROSTATE CANCER: ICD-10-CM

## 2019-03-13 DIAGNOSIS — E03.4 HYPOTHYROIDISM DUE TO ACQUIRED ATROPHY OF THYROID: ICD-10-CM

## 2019-03-13 PROCEDURE — 99999 PR PBB SHADOW E&M-EST. PATIENT-LVL III: ICD-10-PCS | Mod: PBBFAC,,, | Performed by: INTERNAL MEDICINE

## 2019-03-13 PROCEDURE — 99213 OFFICE O/P EST LOW 20 MIN: CPT | Mod: PBBFAC | Performed by: INTERNAL MEDICINE

## 2019-03-13 PROCEDURE — 99999 PR STA SHADOW: CPT | Mod: PBBFAC,,, | Performed by: INTERNAL MEDICINE

## 2019-03-13 PROCEDURE — 99213 OFFICE O/P EST LOW 20 MIN: CPT | Mod: S$PBB | Performed by: INTERNAL MEDICINE

## 2019-03-13 PROCEDURE — 99999 PR PBB SHADOW E&M-EST. PATIENT-LVL III: CPT | Mod: PBBFAC,,, | Performed by: INTERNAL MEDICINE

## 2019-03-13 RX ORDER — LEVOTHYROXINE SODIUM 50 UG/1
TABLET ORAL
Qty: 90 TABLET | Refills: 1 | Status: SHIPPED | OUTPATIENT
Start: 2019-03-13 | End: 2019-09-16 | Stop reason: SDUPTHER

## 2019-03-13 RX ORDER — ATORVASTATIN CALCIUM 10 MG/1
10 TABLET, FILM COATED ORAL DAILY
Qty: 90 TABLET | Refills: 1 | Status: SHIPPED | OUTPATIENT
Start: 2019-03-13 | End: 2019-09-18

## 2019-03-13 NOTE — PROGRESS NOTES
"Subjective:       Patient ID: Pawan Cantrell is a 79 y.o. male.    Chief Complaint: Hyperlipidemia (6 month f/u); Thyroid Problem; and Medication Refill    Pawan Cantrell is a 80yo man with hypothyroidism and hyperlipidemia who presents for 6mo follow-up.  No acute complaints.  Labs reviewed.      Hyperlipidemia   This is a chronic problem. The current episode started more than 1 year ago. The problem is uncontrolled. Recent lipid tests were reviewed and are high. Pertinent negatives include no chest pain or shortness of breath. He is currently on no antihyperlipidemic treatment (i wanted him to take meds; " i rather do it with diet "). The current treatment provides no improvement of lipids.   Thyroid Problem   Presents for follow-up visit. Symptoms include fatigue and weight gain. Patient reports no anxiety, constipation, depressed mood, diaphoresis, diarrhea, heat intolerance, hoarse voice, leg swelling, palpitations or tremors. The symptoms have been improving. His past medical history is significant for hyperlipidemia.     Review of Systems   Constitutional: Positive for fatigue and weight gain. Negative for chills, diaphoresis and fever.   HENT: Negative for congestion, hoarse voice, postnasal drip and sore throat.    Eyes: Negative for photophobia.   Respiratory: Negative for chest tightness and shortness of breath.    Cardiovascular: Negative for chest pain and palpitations.   Gastrointestinal: Negative for abdominal distention, abdominal pain, blood in stool, constipation, diarrhea and vomiting.   Endocrine: Negative for heat intolerance.   Genitourinary: Positive for frequency. Negative for dysuria, flank pain and hematuria.   Musculoskeletal: Negative for back pain.   Skin: Negative for pallor.   Neurological: Negative for dizziness, tremors, seizures, facial asymmetry, speech difficulty and numbness.   Hematological: Does not bruise/bleed easily.   Psychiatric/Behavioral: Negative for agitation and " suicidal ideas. The patient is not nervous/anxious.        Objective:      Physical Exam   Constitutional: He is oriented to person, place, and time. He appears well-developed and well-nourished.   HENT:   Head: Normocephalic and atraumatic.   Neck: No JVD present.   Cardiovascular: Normal rate, regular rhythm and normal heart sounds.   Pulmonary/Chest: Effort normal and breath sounds normal.   Abdominal: Soft. Bowel sounds are normal. There is no tenderness.   Musculoskeletal: He exhibits no edema.   Neurological: He is alert and oriented to person, place, and time.   Skin: Skin is warm and dry.   Psychiatric: He has a normal mood and affect. His behavior is normal. Judgment and thought content normal.   Nursing note and vitals reviewed.      Assessment:       1. Mixed hyperlipidemia    2. Hypothyroidism due to acquired atrophy of thyroid        Plan:   Pawan was seen today for hyperlipidemia, thyroid problem and medication refill.    Diagnoses and all orders for this visit:    Mixed hyperlipidemia  -     CBC auto differential; Future  -     Comprehensive metabolic panel; Future  -     Lipid panel; Future  -     atorvastatin (LIPITOR) 10 MG tablet; Take 1 tablet (10 mg total) by mouth once daily.  -     Lipid panel; Future  -     Hepatic function panel; Future  - Will recheck LFTs and Lipid panel in 3mo with starting lipitor  - Advised on side effects to watch for with lipitor    Hypothyroidism due to acquired atrophy of thyroid  -     TSH; Future  -     levothyroxine (SYNTHROID) 50 MCG tablet; TAKE 1 TABLET(50 MCG) BY MOUTH EVERY DAY  - Doing well continue current dose    Screening for prostate cancer  -     PSA, Screening; Future        Problem List Items Addressed This Visit     Hyperlipidemia - Primary    Relevant Medications    atorvastatin (LIPITOR) 10 MG tablet    Other Relevant Orders    CBC auto differential    Comprehensive metabolic panel    Lipid panel    Lipid panel    Hepatic function panel     Hypothyroidism due to acquired atrophy of thyroid    Relevant Medications    levothyroxine (SYNTHROID) 50 MCG tablet    Other Relevant Orders    TSH      Other Visit Diagnoses     Screening for prostate cancer        Relevant Orders    PSA, Screening

## 2019-06-12 ENCOUNTER — CLINICAL SUPPORT (OUTPATIENT)
Dept: INTERNAL MEDICINE | Facility: CLINIC | Age: 79
End: 2019-06-12
Payer: MEDICARE

## 2019-06-12 DIAGNOSIS — E78.2 MIXED HYPERLIPIDEMIA: ICD-10-CM

## 2019-06-12 LAB
ALBUMIN SERPL BCP-MCNC: 4.4 G/DL (ref 3.5–5.2)
ALP SERPL-CCNC: 60 U/L (ref 55–135)
ALT SERPL W/O P-5'-P-CCNC: 14 U/L (ref 10–44)
AST SERPL-CCNC: 22 U/L (ref 10–40)
BILIRUB DIRECT SERPL-MCNC: 0.3 MG/DL (ref 0.1–0.3)
BILIRUB SERPL-MCNC: 0.9 MG/DL (ref 0.1–1)
CHOLEST SERPL-MCNC: 218 MG/DL (ref 120–199)
CHOLEST/HDLC SERPL: 5 {RATIO} (ref 2–5)
HDLC SERPL-MCNC: 44 MG/DL (ref 40–75)
HDLC SERPL: 20.2 % (ref 20–50)
LDLC SERPL CALC-MCNC: 147.2 MG/DL (ref 63–159)
NONHDLC SERPL-MCNC: 174 MG/DL
PROT SERPL-MCNC: 8.2 G/DL (ref 6–8.4)
TRIGL SERPL-MCNC: 134 MG/DL (ref 30–150)

## 2019-06-12 PROCEDURE — 80076 HEPATIC FUNCTION PANEL: CPT

## 2019-06-12 PROCEDURE — 99999 PR STA SHADOW: ICD-10-PCS | Mod: PBBFAC,,,

## 2019-06-12 PROCEDURE — 80061 LIPID PANEL: CPT

## 2019-06-12 PROCEDURE — 36415 COLL VENOUS BLD VENIPUNCTURE: CPT | Mod: PBBFAC

## 2019-06-12 PROCEDURE — 99999 PR STA SHADOW: CPT | Mod: PBBFAC,,,

## 2019-09-03 ENCOUNTER — PATIENT OUTREACH (OUTPATIENT)
Dept: ADMINISTRATIVE | Facility: HOSPITAL | Age: 79
End: 2019-09-03

## 2019-09-11 ENCOUNTER — CLINICAL SUPPORT (OUTPATIENT)
Dept: INTERNAL MEDICINE | Facility: CLINIC | Age: 79
End: 2019-09-11
Payer: MEDICARE

## 2019-09-11 DIAGNOSIS — E03.4 HYPOTHYROIDISM DUE TO ACQUIRED ATROPHY OF THYROID: ICD-10-CM

## 2019-09-11 DIAGNOSIS — E78.2 MIXED HYPERLIPIDEMIA: ICD-10-CM

## 2019-09-11 DIAGNOSIS — Z12.5 SCREENING FOR PROSTATE CANCER: ICD-10-CM

## 2019-09-11 LAB
ALBUMIN SERPL BCP-MCNC: 4.2 G/DL (ref 3.5–5.2)
ALP SERPL-CCNC: 51 U/L (ref 55–135)
ALT SERPL W/O P-5'-P-CCNC: 13 U/L (ref 10–44)
ANION GAP SERPL CALC-SCNC: 10 MMOL/L (ref 8–16)
AST SERPL-CCNC: 22 U/L (ref 10–40)
BASOPHILS # BLD AUTO: 0.05 K/UL (ref 0–0.2)
BASOPHILS NFR BLD: 0.8 % (ref 0–1.9)
BILIRUB SERPL-MCNC: 1.3 MG/DL (ref 0.1–1)
BUN SERPL-MCNC: 19 MG/DL (ref 8–23)
CALCIUM SERPL-MCNC: 9.7 MG/DL (ref 8.7–10.5)
CHLORIDE SERPL-SCNC: 106 MMOL/L (ref 95–110)
CHOLEST SERPL-MCNC: 210 MG/DL (ref 120–199)
CHOLEST/HDLC SERPL: 5.4 {RATIO} (ref 2–5)
CO2 SERPL-SCNC: 26 MMOL/L (ref 23–29)
COMPLEXED PSA SERPL-MCNC: 4.7 NG/ML (ref 0–4)
CREAT SERPL-MCNC: 1.2 MG/DL (ref 0.5–1.4)
DIFFERENTIAL METHOD: ABNORMAL
EOSINOPHIL # BLD AUTO: 0.2 K/UL (ref 0–0.5)
EOSINOPHIL NFR BLD: 3.6 % (ref 0–8)
ERYTHROCYTE [DISTWIDTH] IN BLOOD BY AUTOMATED COUNT: 13.8 % (ref 11.5–14.5)
EST. GFR  (AFRICAN AMERICAN): >60 ML/MIN/1.73 M^2
EST. GFR  (NON AFRICAN AMERICAN): 57 ML/MIN/1.73 M^2
GLUCOSE SERPL-MCNC: 94 MG/DL (ref 70–110)
HCT VFR BLD AUTO: 50 % (ref 40–54)
HDLC SERPL-MCNC: 39 MG/DL (ref 40–75)
HDLC SERPL: 18.6 % (ref 20–50)
HGB BLD-MCNC: 16.3 G/DL (ref 14–18)
IMM GRANULOCYTES # BLD AUTO: 0.01 K/UL (ref 0–0.04)
IMM GRANULOCYTES NFR BLD AUTO: 0.2 % (ref 0–0.5)
LDLC SERPL CALC-MCNC: 146.6 MG/DL (ref 63–159)
LYMPHOCYTES # BLD AUTO: 1.9 K/UL (ref 1–4.8)
LYMPHOCYTES NFR BLD: 32.4 % (ref 18–48)
MCH RBC QN AUTO: 28.6 PG (ref 27–31)
MCHC RBC AUTO-ENTMCNC: 32.6 G/DL (ref 32–36)
MCV RBC AUTO: 88 FL (ref 82–98)
MONOCYTES # BLD AUTO: 0.5 K/UL (ref 0.3–1)
MONOCYTES NFR BLD: 7.8 % (ref 4–15)
NEUTROPHILS # BLD AUTO: 3.3 K/UL (ref 1.8–7.7)
NEUTROPHILS NFR BLD: 55.2 % (ref 38–73)
NONHDLC SERPL-MCNC: 171 MG/DL
NRBC BLD-RTO: 0 /100 WBC
PLATELET # BLD AUTO: 229 K/UL (ref 150–350)
PMV BLD AUTO: 8.9 FL (ref 9.2–12.9)
POTASSIUM SERPL-SCNC: 4.8 MMOL/L (ref 3.5–5.1)
PROT SERPL-MCNC: 7.6 G/DL (ref 6–8.4)
RBC # BLD AUTO: 5.69 M/UL (ref 4.6–6.2)
SODIUM SERPL-SCNC: 142 MMOL/L (ref 136–145)
T4 FREE SERPL-MCNC: 1.05 NG/DL (ref 0.71–1.51)
TRIGL SERPL-MCNC: 122 MG/DL (ref 30–150)
TSH SERPL DL<=0.005 MIU/L-ACNC: 0.33 UIU/ML (ref 0.4–4)
WBC # BLD AUTO: 5.89 K/UL (ref 3.9–12.7)

## 2019-09-11 PROCEDURE — 99999 PR STA SHADOW: CPT | Mod: PBBFAC,,,

## 2019-09-11 PROCEDURE — 84443 ASSAY THYROID STIM HORMONE: CPT

## 2019-09-11 PROCEDURE — 80053 COMPREHEN METABOLIC PANEL: CPT

## 2019-09-11 PROCEDURE — 36415 COLL VENOUS BLD VENIPUNCTURE: CPT | Mod: PBBFAC

## 2019-09-11 PROCEDURE — 99999 PR STA SHADOW: ICD-10-PCS | Mod: PBBFAC,,,

## 2019-09-11 PROCEDURE — 85025 COMPLETE CBC W/AUTO DIFF WBC: CPT

## 2019-09-11 PROCEDURE — 84153 ASSAY OF PSA TOTAL: CPT

## 2019-09-11 PROCEDURE — 80061 LIPID PANEL: CPT

## 2019-09-11 PROCEDURE — 84439 ASSAY OF FREE THYROXINE: CPT

## 2019-09-16 DIAGNOSIS — E03.4 HYPOTHYROIDISM DUE TO ACQUIRED ATROPHY OF THYROID: ICD-10-CM

## 2019-09-16 RX ORDER — LEVOTHYROXINE SODIUM 50 UG/1
TABLET ORAL
Qty: 90 TABLET | Refills: 0 | Status: SHIPPED | OUTPATIENT
Start: 2019-09-16 | End: 2019-12-17 | Stop reason: SDUPTHER

## 2019-09-18 ENCOUNTER — OFFICE VISIT (OUTPATIENT)
Dept: INTERNAL MEDICINE | Facility: CLINIC | Age: 79
End: 2019-09-18
Payer: MEDICARE

## 2019-09-18 VITALS
HEIGHT: 68 IN | BODY MASS INDEX: 19.95 KG/M2 | HEART RATE: 68 BPM | WEIGHT: 131.63 LBS | SYSTOLIC BLOOD PRESSURE: 122 MMHG | RESPIRATION RATE: 16 BRPM | DIASTOLIC BLOOD PRESSURE: 80 MMHG

## 2019-09-18 DIAGNOSIS — E78.2 MIXED HYPERLIPIDEMIA: Primary | ICD-10-CM

## 2019-09-18 DIAGNOSIS — R97.20 ELEVATED PSA: ICD-10-CM

## 2019-09-18 DIAGNOSIS — E03.4 HYPOTHYROIDISM DUE TO ACQUIRED ATROPHY OF THYROID: ICD-10-CM

## 2019-09-18 PROCEDURE — 99999 PR PBB SHADOW E&M-EST. PATIENT-LVL III: CPT | Mod: PBBFAC,,, | Performed by: INTERNAL MEDICINE

## 2019-09-18 PROCEDURE — 99999 PR STA SHADOW: ICD-10-PCS | Mod: PBBFAC,,, | Performed by: INTERNAL MEDICINE

## 2019-09-18 PROCEDURE — 99999 FLU VACCINE - HIGH DOSE (65+) PRESERVATIVE FREE IM: CPT | Mod: PBBFAC,,,

## 2019-09-18 PROCEDURE — 99999 FLU VACCINE - HIGH DOSE (65+) PRESERVATIVE FREE IM: ICD-10-PCS | Mod: PBBFAC,,,

## 2019-09-18 PROCEDURE — 90662 IIV NO PRSV INCREASED AG IM: CPT | Mod: PBBFAC

## 2019-09-18 PROCEDURE — 99999 PR STA SHADOW: CPT | Mod: PBBFAC,,, | Performed by: INTERNAL MEDICINE

## 2019-09-18 PROCEDURE — 99214 OFFICE O/P EST MOD 30 MIN: CPT | Mod: S$PBB | Performed by: INTERNAL MEDICINE

## 2019-09-18 PROCEDURE — 99213 OFFICE O/P EST LOW 20 MIN: CPT | Mod: PBBFAC,25 | Performed by: INTERNAL MEDICINE

## 2019-09-18 NOTE — PROGRESS NOTES
"Subjective:       Patient ID: Pawan Cantrell is a 79 y.o. male.    Chief Complaint: Follow-up; Hyperlipidemia; and Thyroid Problem    Pawan Cantrell is a 78 yo man with hypothyroidism and hyperlipidemia who presents for 6mo follow-up.  No acute complaints.  Labs reviewed.  He stopped his lipitor ;will not take it       Hyperlipidemia   This is a chronic problem. The current episode started more than 1 year ago. The problem is uncontrolled. Recent lipid tests were reviewed and are high. Pertinent negatives include no chest pain or shortness of breath. He is currently on no antihyperlipidemic treatment (i wanted him to take meds; " i rather do it with diet "). The current treatment provides no improvement of lipids.   Thyroid Problem   Presents for follow-up visit. Symptoms include fatigue and weight gain. Patient reports no anxiety, constipation, depressed mood, diaphoresis, diarrhea, heat intolerance, hoarse voice, leg swelling, palpitations or tremors. The symptoms have been improving. His past medical history is significant for hyperlipidemia.     Review of Systems   Constitutional: Positive for fatigue and weight gain. Negative for chills, diaphoresis and fever.   HENT: Negative for congestion, hoarse voice, postnasal drip and sore throat.    Eyes: Negative for photophobia.   Respiratory: Negative for chest tightness and shortness of breath.    Cardiovascular: Negative for chest pain and palpitations.   Gastrointestinal: Negative for abdominal distention, abdominal pain, blood in stool, constipation, diarrhea and vomiting.   Endocrine: Negative for heat intolerance.   Genitourinary: Positive for frequency. Negative for dysuria, flank pain and hematuria.   Musculoskeletal: Negative for back pain.   Skin: Negative for pallor.   Neurological: Negative for dizziness, tremors, seizures, facial asymmetry, speech difficulty and numbness.   Hematological: Does not bruise/bleed easily.   Psychiatric/Behavioral: Negative " for agitation and suicidal ideas. The patient is not nervous/anxious.        Objective:      Physical Exam   Constitutional: He is oriented to person, place, and time. He appears well-developed and well-nourished.   HENT:   Head: Normocephalic and atraumatic.   Neck: No JVD present.   Cardiovascular: Normal rate, regular rhythm and normal heart sounds.   Pulmonary/Chest: Effort normal and breath sounds normal.   Abdominal: Soft. Bowel sounds are normal. There is no tenderness.   Musculoskeletal: He exhibits no edema.   Neurological: He is alert and oriented to person, place, and time.   Skin: Skin is warm and dry.   Psychiatric: He has a normal mood and affect. His behavior is normal. Judgment and thought content normal.   Nursing note and vitals reviewed.      Assessment:       1. Mixed hyperlipidemia    2. Hypothyroidism due to acquired atrophy of thyroid    3. Elevated PSA        Plan:   Pawan was seen today for follow-up, hyperlipidemia and thyroid problem.    Diagnoses and all orders for this visit:    Mixed hyperlipidemia  -     CBC auto differential; Future  -     Comprehensive metabolic panel; Future  -     Lipid panel; Future  -     TSH; Future  Limit the cholesterol in your diet to less than 300 mg per day.   Fats should contribute no more than 20 to 35% of your daily calories.   Less than 7 to 10% of your calories should come from saturated fat.   Avoid saturated fat products e.g., Butter, some oils, meat, and poultry fat contain a lot of saturated fat.   Check food labels for fat and cholesterol content. Choose the foods with less fat per serving.   Limit the amount of butter and margarine you eat.   Use salad dressings and margarine made with polyunsaturated and monounsaturated fats.   Use egg whites or egg substitutes rather than whole eggs.   Replace whole-milk dairy products with nonfat or low-fat milk, cheese, spreads, and yogurt.   Eat skinless chicken, turkey, fish, and meatless entrees more  often than red meat.   Choose lean cuts of meat and trim off all visible fat. Keep portion sizes moderate.   Avoid fatty desserts such as ice cream, cream-filled cakes, and cheesecakes. Choose fresh fruits, nonfat frozen yogurt, Popsicles, etc.   Reduce the amount of fried foods, vending machine food, and fast food you eat.   Eat fruits and vegetables (especially fresh fruits and leafy vegetables), beans, and whole grains daily. The fiber in these foods helps lower cholesterol.   Look for low-fat or nonfat varieties of the foods you like to eat, or look for substitutes.   You may need to exercise 60 minutes a day to prevent weight gain and 90 minutes a day to lose weight.  Hypothyroidism due to acquired atrophy of thyroid  -     CBC auto differential; Future  -     Comprehensive metabolic panel; Future  -     Lipid panel; Future  -     TSH; Future  Well controlled.  Continue same medication and dose.    Elevated PSA  -     Ambulatory Referral to Urology      Problem List Items Addressed This Visit     Hyperlipidemia - Primary    Hypothyroidism due to acquired atrophy of thyroid      Other Visit Diagnoses     Elevated PSA

## 2019-09-25 ENCOUNTER — OFFICE VISIT (OUTPATIENT)
Dept: UROLOGY | Facility: CLINIC | Age: 79
End: 2019-09-25
Attending: UROLOGY
Payer: MEDICARE

## 2019-09-25 VITALS
HEART RATE: 72 BPM | HEIGHT: 68 IN | BODY MASS INDEX: 19.85 KG/M2 | RESPIRATION RATE: 16 BRPM | DIASTOLIC BLOOD PRESSURE: 74 MMHG | WEIGHT: 131 LBS | SYSTOLIC BLOOD PRESSURE: 127 MMHG

## 2019-09-25 DIAGNOSIS — R97.20 ELEVATED PSA: ICD-10-CM

## 2019-09-25 DIAGNOSIS — N40.0 BENIGN NON-NODULAR PROSTATIC HYPERPLASIA WITHOUT LOWER URINARY TRACT SYMPTOMS: Primary | ICD-10-CM

## 2019-09-25 PROCEDURE — 99999 PR STA SHADOW: CPT | Mod: PBBFAC,,, | Performed by: UROLOGY

## 2019-09-25 PROCEDURE — 99999 PR STA SHADOW: ICD-10-PCS | Mod: PBBFAC,,, | Performed by: UROLOGY

## 2019-09-25 PROCEDURE — 99204 OFFICE O/P NEW MOD 45 MIN: CPT | Mod: S$PBB | Performed by: UROLOGY

## 2019-09-25 PROCEDURE — 99999 PR PBB SHADOW E&M-EST. PATIENT-LVL III: CPT | Mod: PBBFAC,,, | Performed by: UROLOGY

## 2019-09-25 PROCEDURE — 99213 OFFICE O/P EST LOW 20 MIN: CPT | Mod: PBBFAC | Performed by: UROLOGY

## 2019-09-25 NOTE — PROGRESS NOTES
Subjective:       Patient ID: Pawan Cantrell is a 79 y.o. male.    Chief Complaint: Consult (elevated PSA )    HPI patient is a consult from Dr. Ward with an elevated PSA of 4.7.  Patient is voiding well without any complaints.  He has a negative family history of prostate cancer.    Past Medical History:   Diagnosis Date    Hyperlipidemia     Hyperthyroidism        Past Surgical History:   Procedure Laterality Date    EYE SURGERY      HERNIA REPAIR      TONSILLECTOMY      TUMOR REMOVAL      abdomen and neck       Family History   Problem Relation Age of Onset    Hypertension Mother     Cancer Father         prostate       Social History     Socioeconomic History    Marital status:      Spouse name: Not on file    Number of children: Not on file    Years of education: Not on file    Highest education level: Not on file   Occupational History    Not on file   Social Needs    Financial resource strain: Not on file    Food insecurity:     Worry: Not on file     Inability: Not on file    Transportation needs:     Medical: Not on file     Non-medical: Not on file   Tobacco Use    Smoking status: Never Smoker    Smokeless tobacco: Never Used   Substance and Sexual Activity    Alcohol use: No    Drug use: No    Sexual activity: Not Currently   Lifestyle    Physical activity:     Days per week: Not on file     Minutes per session: Not on file    Stress: Not at all   Relationships    Social connections:     Talks on phone: Not on file     Gets together: Not on file     Attends [employer] service: Not on file     Active member of club or organization: Not on file     Attends meetings of clubs or organizations: Not on file     Relationship status: Not on file   Other Topics Concern    Not on file   Social History Narrative    Not on file       Allergies:  Patient has no known allergies.    Medications:    Current Outpatient Medications:     levothyroxine (SYNTHROID) 50 MCG tablet, TAKE 1  TABLET(50 MCG) BY MOUTH EVERY DAY, Disp: 90 tablet, Rfl: 0    Review of Systems   Constitutional: Negative for activity change, appetite change, chills, diaphoresis, fatigue, fever and unexpected weight change.   HENT: Negative for congestion, dental problem, hearing loss, mouth sores, postnasal drip, rhinorrhea, sinus pressure and trouble swallowing.    Eyes: Negative for pain, discharge and itching.   Respiratory: Negative for apnea, cough, choking, chest tightness, shortness of breath and wheezing.    Cardiovascular: Negative for chest pain, palpitations and leg swelling.   Gastrointestinal: Negative for abdominal distention, abdominal pain, anal bleeding, blood in stool, constipation, diarrhea, nausea, rectal pain and vomiting.   Endocrine: Negative for polydipsia and polyuria.   Genitourinary: Negative for decreased urine volume, difficulty urinating, discharge, dysuria, enuresis, flank pain, frequency, genital sores, hematuria, penile pain, penile swelling, scrotal swelling, testicular pain and urgency.   Musculoskeletal: Negative for arthralgias, back pain and myalgias.   Skin: Negative for color change, rash and wound.   Neurological: Negative for dizziness, syncope, speech difficulty, light-headedness and headaches.   Hematological: Negative for adenopathy. Does not bruise/bleed easily.   Psychiatric/Behavioral: Negative for behavioral problems, confusion, hallucinations and sleep disturbance.       Objective:      Physical Exam   Constitutional: He appears well-developed.   HENT:   Head: Normocephalic.   Cardiovascular: Normal rate.    Pulmonary/Chest: Effort normal.   Abdominal: Soft.   Genitourinary: Prostate normal.   Genitourinary Comments: 30 g benign and no now nodules.  Patient has a small left spermatocele   Neurological: He is alert.   Skin: Skin is warm.     Psychiatric: He has a normal mood and affect.       Assessment:       1. Benign non-nodular prostatic hyperplasia without lower urinary  tract symptoms    2. Elevated PSA        Plan:       Pawan was seen today for consult.    Diagnoses and all orders for this visit:    Benign non-nodular prostatic hyperplasia without lower urinary tract symptoms    Elevated PSA         patient has a normal age adjusted PSA.  No nodules are palpable.  There is no indication for further treatment unless a nodule is palpable.  He may return to clinic p.r.n.

## 2019-12-17 DIAGNOSIS — E03.4 HYPOTHYROIDISM DUE TO ACQUIRED ATROPHY OF THYROID: ICD-10-CM

## 2019-12-17 RX ORDER — LEVOTHYROXINE SODIUM 50 UG/1
TABLET ORAL
Qty: 90 TABLET | Refills: 0 | Status: SHIPPED | OUTPATIENT
Start: 2019-12-17 | End: 2020-03-25 | Stop reason: SDUPTHER

## 2020-03-11 ENCOUNTER — PATIENT OUTREACH (OUTPATIENT)
Dept: ADMINISTRATIVE | Facility: HOSPITAL | Age: 80
End: 2020-03-11

## 2020-03-18 ENCOUNTER — LAB VISIT (OUTPATIENT)
Dept: LAB | Facility: HOSPITAL | Age: 80
End: 2020-03-18
Attending: INTERNAL MEDICINE
Payer: MEDICARE

## 2020-03-18 DIAGNOSIS — E78.2 MIXED HYPERLIPIDEMIA: ICD-10-CM

## 2020-03-18 DIAGNOSIS — E03.4 HYPOTHYROIDISM DUE TO ACQUIRED ATROPHY OF THYROID: ICD-10-CM

## 2020-03-18 LAB
ALBUMIN SERPL BCP-MCNC: 3.9 G/DL (ref 3.5–5.2)
ALP SERPL-CCNC: 50 U/L (ref 55–135)
ALT SERPL W/O P-5'-P-CCNC: 12 U/L (ref 10–44)
ANION GAP SERPL CALC-SCNC: 8 MMOL/L (ref 8–16)
AST SERPL-CCNC: 25 U/L (ref 10–40)
BASOPHILS # BLD AUTO: 0.06 K/UL (ref 0–0.2)
BASOPHILS NFR BLD: 1.1 % (ref 0–1.9)
BILIRUB SERPL-MCNC: 0.8 MG/DL (ref 0.1–1)
BUN SERPL-MCNC: 16 MG/DL (ref 8–23)
CALCIUM SERPL-MCNC: 9.1 MG/DL (ref 8.7–10.5)
CHLORIDE SERPL-SCNC: 107 MMOL/L (ref 95–110)
CHOLEST SERPL-MCNC: 200 MG/DL (ref 120–199)
CHOLEST/HDLC SERPL: 4.9 {RATIO} (ref 2–5)
CO2 SERPL-SCNC: 25 MMOL/L (ref 23–29)
CREAT SERPL-MCNC: 1.1 MG/DL (ref 0.5–1.4)
DIFFERENTIAL METHOD: ABNORMAL
EOSINOPHIL # BLD AUTO: 0.2 K/UL (ref 0–0.5)
EOSINOPHIL NFR BLD: 3.1 % (ref 0–8)
ERYTHROCYTE [DISTWIDTH] IN BLOOD BY AUTOMATED COUNT: 13.5 % (ref 11.5–14.5)
EST. GFR  (AFRICAN AMERICAN): >60 ML/MIN/1.73 M^2
EST. GFR  (NON AFRICAN AMERICAN): >60 ML/MIN/1.73 M^2
GLUCOSE SERPL-MCNC: 92 MG/DL (ref 70–110)
HCT VFR BLD AUTO: 45.3 % (ref 40–54)
HDLC SERPL-MCNC: 41 MG/DL (ref 40–75)
HDLC SERPL: 20.5 % (ref 20–50)
HGB BLD-MCNC: 15 G/DL (ref 14–18)
IMM GRANULOCYTES # BLD AUTO: 0.02 K/UL (ref 0–0.04)
IMM GRANULOCYTES NFR BLD AUTO: 0.4 % (ref 0–0.5)
LDLC SERPL CALC-MCNC: 138 MG/DL (ref 63–159)
LYMPHOCYTES # BLD AUTO: 1.4 K/UL (ref 1–4.8)
LYMPHOCYTES NFR BLD: 25.9 % (ref 18–48)
MCH RBC QN AUTO: 28.7 PG (ref 27–31)
MCHC RBC AUTO-ENTMCNC: 33.1 G/DL (ref 32–36)
MCV RBC AUTO: 87 FL (ref 82–98)
MONOCYTES # BLD AUTO: 0.6 K/UL (ref 0.3–1)
MONOCYTES NFR BLD: 10.4 % (ref 4–15)
NEUTROPHILS # BLD AUTO: 3.2 K/UL (ref 1.8–7.7)
NEUTROPHILS NFR BLD: 59.1 % (ref 38–73)
NONHDLC SERPL-MCNC: 159 MG/DL
NRBC BLD-RTO: 0 /100 WBC
PLATELET # BLD AUTO: 220 K/UL (ref 150–350)
PMV BLD AUTO: 8.3 FL (ref 9.2–12.9)
POTASSIUM SERPL-SCNC: 4.3 MMOL/L (ref 3.5–5.1)
PROT SERPL-MCNC: 7.4 G/DL (ref 6–8.4)
RBC # BLD AUTO: 5.22 M/UL (ref 4.6–6.2)
SODIUM SERPL-SCNC: 140 MMOL/L (ref 136–145)
TRIGL SERPL-MCNC: 105 MG/DL (ref 30–150)
TSH SERPL DL<=0.005 MIU/L-ACNC: 0.52 UIU/ML (ref 0.4–4)
WBC # BLD AUTO: 5.41 K/UL (ref 3.9–12.7)

## 2020-03-18 PROCEDURE — 84443 ASSAY THYROID STIM HORMONE: CPT

## 2020-03-18 PROCEDURE — 80053 COMPREHEN METABOLIC PANEL: CPT

## 2020-03-18 PROCEDURE — 80061 LIPID PANEL: CPT

## 2020-03-18 PROCEDURE — 36415 COLL VENOUS BLD VENIPUNCTURE: CPT

## 2020-03-18 PROCEDURE — 85025 COMPLETE CBC W/AUTO DIFF WBC: CPT

## 2020-03-25 ENCOUNTER — OFFICE VISIT (OUTPATIENT)
Dept: INTERNAL MEDICINE | Facility: CLINIC | Age: 80
End: 2020-03-25
Payer: MEDICARE

## 2020-03-25 VITALS
OXYGEN SATURATION: 96 % | TEMPERATURE: 98 F | RESPIRATION RATE: 20 BRPM | DIASTOLIC BLOOD PRESSURE: 70 MMHG | HEIGHT: 68 IN | WEIGHT: 128.31 LBS | HEART RATE: 77 BPM | SYSTOLIC BLOOD PRESSURE: 118 MMHG | BODY MASS INDEX: 19.45 KG/M2

## 2020-03-25 DIAGNOSIS — E78.2 MIXED HYPERLIPIDEMIA: ICD-10-CM

## 2020-03-25 DIAGNOSIS — E03.4 HYPOTHYROIDISM DUE TO ACQUIRED ATROPHY OF THYROID: Primary | ICD-10-CM

## 2020-03-25 PROCEDURE — 99213 OFFICE O/P EST LOW 20 MIN: CPT | Mod: PBBFAC | Performed by: INTERNAL MEDICINE

## 2020-03-25 PROCEDURE — 99999 PR PBB SHADOW E&M-EST. PATIENT-LVL III: CPT | Mod: PBBFAC,,, | Performed by: INTERNAL MEDICINE

## 2020-03-25 PROCEDURE — 99214 OFFICE O/P EST MOD 30 MIN: CPT | Mod: S$PBB | Performed by: INTERNAL MEDICINE

## 2020-03-25 PROCEDURE — 99999 PR STA SHADOW: CPT | Mod: PBBFAC,,, | Performed by: INTERNAL MEDICINE

## 2020-03-25 PROCEDURE — 99999 PR PBB SHADOW E&M-EST. PATIENT-LVL III: ICD-10-PCS | Mod: PBBFAC,,, | Performed by: INTERNAL MEDICINE

## 2020-03-25 RX ORDER — LEVOTHYROXINE SODIUM 50 UG/1
50 TABLET ORAL
Qty: 90 TABLET | Refills: 3 | Status: SHIPPED | OUTPATIENT
Start: 2020-03-25 | End: 2020-03-30

## 2020-03-25 NOTE — PROGRESS NOTES
"Subjective:       Patient ID: Pawan Cantrell is a 80 y.o. male.    Chief Complaint: Follow-up (6 mo); Hyperlipidemia; and Thyroid Problem    Pawan Cantrell is a 79 yo man with hypothyroidism and hyperlipidemia who presents for 6mo follow-up.  No acute complaints.  Labs reviewed.  He stopped his lipitor ;will not take it       Follow-up   Associated symptoms include fatigue. Pertinent negatives include no abdominal pain, chest pain, chills, congestion, diaphoresis, fever, numbness, sore throat or vomiting.   Hyperlipidemia   This is a chronic problem. The current episode started more than 1 year ago. The problem is uncontrolled. Recent lipid tests were reviewed and are high. Pertinent negatives include no chest pain or shortness of breath. He is currently on no antihyperlipidemic treatment (i wanted him to take meds; " i rather do it with diet "). The current treatment provides no improvement of lipids.   Thyroid Problem   Presents for follow-up visit. Symptoms include fatigue and weight gain. Patient reports no anxiety, constipation, depressed mood, diaphoresis, diarrhea, heat intolerance, hoarse voice, leg swelling, palpitations or tremors. The symptoms have been improving. His past medical history is significant for hyperlipidemia.     Review of Systems   Constitutional: Positive for fatigue and weight gain. Negative for chills, diaphoresis and fever.   HENT: Negative for congestion, hoarse voice, postnasal drip and sore throat.    Eyes: Negative for photophobia.   Respiratory: Negative for chest tightness and shortness of breath.    Cardiovascular: Negative for chest pain and palpitations.   Gastrointestinal: Negative for abdominal distention, abdominal pain, blood in stool, constipation, diarrhea and vomiting.   Endocrine: Negative for heat intolerance.   Genitourinary: Positive for frequency. Negative for dysuria, flank pain and hematuria.   Musculoskeletal: Negative for back pain.   Skin: Negative for " pallor.   Neurological: Negative for dizziness, tremors, seizures, facial asymmetry, speech difficulty and numbness.   Hematological: Does not bruise/bleed easily.   Psychiatric/Behavioral: Negative for agitation and suicidal ideas. The patient is not nervous/anxious.        Objective:      Physical Exam   Constitutional: He is oriented to person, place, and time. He appears well-developed and well-nourished.   HENT:   Head: Normocephalic and atraumatic.   Neck: No JVD present.   Cardiovascular: Normal rate, regular rhythm and normal heart sounds.   Pulmonary/Chest: Effort normal and breath sounds normal.   Abdominal: Soft. Bowel sounds are normal. There is no tenderness.   Musculoskeletal: He exhibits no edema.   Neurological: He is alert and oriented to person, place, and time.   Skin: Skin is warm and dry.   Psychiatric: He has a normal mood and affect. His behavior is normal. Judgment and thought content normal.   Nursing note and vitals reviewed.      Assessment:       1. Hypothyroidism due to acquired atrophy of thyroid    2. Mixed hyperlipidemia        Plan:   Pawan was seen today for follow-up, hyperlipidemia and thyroid problem.    Diagnoses and all orders for this visit:    Hypothyroidism due to acquired atrophy of thyroid  -     CBC auto differential; Future  -     Comprehensive metabolic panel; Future  -     Lipid panel; Future  -     TSH; Future  -     levothyroxine (SYNTHROID) 50 MCG tablet; Take 1 tablet (50 mcg total) by mouth before breakfast.  Well controlled.  Continue same medication and dose.      Mixed hyperlipidemia  -     CBC auto differential; Future  -     Comprehensive metabolic panel; Future  -     Lipid panel; Future  -     TSH; Future    Well controlled.  Continue same medication and dose.      Problem List Items Addressed This Visit     Hyperlipidemia    Hypothyroidism due to acquired atrophy of thyroid - Primary

## 2020-03-29 DIAGNOSIS — E03.4 HYPOTHYROIDISM DUE TO ACQUIRED ATROPHY OF THYROID: ICD-10-CM

## 2020-03-30 RX ORDER — LEVOTHYROXINE SODIUM 50 UG/1
TABLET ORAL
Qty: 90 TABLET | Refills: 3 | Status: SHIPPED | OUTPATIENT
Start: 2020-03-30 | End: 2021-03-25 | Stop reason: SDUPTHER

## 2020-04-24 NOTE — LETTER
September 25, 2019      Macrina Ward MD  [address]  [facility] [id number] [place] [address] Spec. - Urology  [address] [facility] [address]  Phone: [phone]          Patient: Pawan Cantrell   MR Number: [id number]   YOB: 1940   Date of Visit: 9/25/2019       Dear Dr. Macrina Ward:    Thank you for referring Pawan Cantrell to me for evaluation. Attached you will find relevant portions of my assessment and plan of care.    If you have questions, please do not hesitate to call me. I look forward to following Pawan Cantrell along with you.    Sincerely,    Star Szymanski Jr., MD    Enclosure  CC:  No Recipients    If you would like to receive this communication electronically, please contact [email or web address] or [phone] to request more information on [employer] access.    For providers and/or their staff who would like to refer a patient to [facility], please contact us through our one-stop-shop provider referral line, [facility], at [phone].    If you feel you have received this communication in error or would no longer like to receive these types of communications, please e-mail [email or web address]         
15

## 2020-07-15 DIAGNOSIS — Z71.89 COMPLEX CARE COORDINATION: ICD-10-CM

## 2020-09-16 ENCOUNTER — LAB VISIT (OUTPATIENT)
Dept: LAB | Facility: HOSPITAL | Age: 80
End: 2020-09-16
Attending: INTERNAL MEDICINE
Payer: MEDICARE

## 2020-09-16 DIAGNOSIS — E03.4 HYPOTHYROIDISM DUE TO ACQUIRED ATROPHY OF THYROID: ICD-10-CM

## 2020-09-16 DIAGNOSIS — E78.2 MIXED HYPERLIPIDEMIA: ICD-10-CM

## 2020-09-16 LAB
ALBUMIN SERPL BCP-MCNC: 3.9 G/DL (ref 3.5–5.2)
ALP SERPL-CCNC: 50 U/L (ref 55–135)
ALT SERPL W/O P-5'-P-CCNC: 15 U/L (ref 10–44)
ANION GAP SERPL CALC-SCNC: 8 MMOL/L (ref 8–16)
AST SERPL-CCNC: 20 U/L (ref 10–40)
BASOPHILS # BLD AUTO: 0.05 K/UL (ref 0–0.2)
BASOPHILS NFR BLD: 0.9 % (ref 0–1.9)
BILIRUB SERPL-MCNC: 0.7 MG/DL (ref 0.1–1)
BUN SERPL-MCNC: 14 MG/DL (ref 8–23)
CALCIUM SERPL-MCNC: 9.2 MG/DL (ref 8.7–10.5)
CHLORIDE SERPL-SCNC: 105 MMOL/L (ref 95–110)
CHOLEST SERPL-MCNC: 187 MG/DL (ref 120–199)
CHOLEST/HDLC SERPL: 5.2 {RATIO} (ref 2–5)
CO2 SERPL-SCNC: 28 MMOL/L (ref 23–29)
CREAT SERPL-MCNC: 1.1 MG/DL (ref 0.5–1.4)
DIFFERENTIAL METHOD: ABNORMAL
EOSINOPHIL # BLD AUTO: 0.1 K/UL (ref 0–0.5)
EOSINOPHIL NFR BLD: 2.2 % (ref 0–8)
ERYTHROCYTE [DISTWIDTH] IN BLOOD BY AUTOMATED COUNT: 13.7 % (ref 11.5–14.5)
EST. GFR  (AFRICAN AMERICAN): >60 ML/MIN/1.73 M^2
EST. GFR  (NON AFRICAN AMERICAN): >60 ML/MIN/1.73 M^2
GLUCOSE SERPL-MCNC: 99 MG/DL (ref 70–110)
HCT VFR BLD AUTO: 48 % (ref 40–54)
HDLC SERPL-MCNC: 36 MG/DL (ref 40–75)
HDLC SERPL: 19.3 % (ref 20–50)
HGB BLD-MCNC: 16.2 G/DL (ref 14–18)
IMM GRANULOCYTES # BLD AUTO: 0.01 K/UL (ref 0–0.04)
IMM GRANULOCYTES NFR BLD AUTO: 0.2 % (ref 0–0.5)
LDLC SERPL CALC-MCNC: 122 MG/DL (ref 63–159)
LYMPHOCYTES # BLD AUTO: 1.8 K/UL (ref 1–4.8)
LYMPHOCYTES NFR BLD: 31.5 % (ref 18–48)
MCH RBC QN AUTO: 29.4 PG (ref 27–31)
MCHC RBC AUTO-ENTMCNC: 33.8 G/DL (ref 32–36)
MCV RBC AUTO: 87 FL (ref 82–98)
MONOCYTES # BLD AUTO: 0.4 K/UL (ref 0.3–1)
MONOCYTES NFR BLD: 7.6 % (ref 4–15)
NEUTROPHILS # BLD AUTO: 3.4 K/UL (ref 1.8–7.7)
NEUTROPHILS NFR BLD: 57.6 % (ref 38–73)
NONHDLC SERPL-MCNC: 151 MG/DL
NRBC BLD-RTO: 0 /100 WBC
PLATELET # BLD AUTO: 188 K/UL (ref 150–350)
PMV BLD AUTO: 8.2 FL (ref 9.2–12.9)
POTASSIUM SERPL-SCNC: 4.5 MMOL/L (ref 3.5–5.1)
PROT SERPL-MCNC: 7.4 G/DL (ref 6–8.4)
RBC # BLD AUTO: 5.51 M/UL (ref 4.6–6.2)
SODIUM SERPL-SCNC: 141 MMOL/L (ref 136–145)
TRIGL SERPL-MCNC: 145 MG/DL (ref 30–150)
TSH SERPL DL<=0.005 MIU/L-ACNC: 0.59 UIU/ML (ref 0.4–4)
WBC # BLD AUTO: 5.81 K/UL (ref 3.9–12.7)

## 2020-09-16 PROCEDURE — 80061 LIPID PANEL: CPT

## 2020-09-16 PROCEDURE — 36415 COLL VENOUS BLD VENIPUNCTURE: CPT

## 2020-09-16 PROCEDURE — 84443 ASSAY THYROID STIM HORMONE: CPT

## 2020-09-16 PROCEDURE — 80053 COMPREHEN METABOLIC PANEL: CPT

## 2020-09-16 PROCEDURE — 85025 COMPLETE CBC W/AUTO DIFF WBC: CPT

## 2020-09-23 ENCOUNTER — OFFICE VISIT (OUTPATIENT)
Dept: INTERNAL MEDICINE | Facility: CLINIC | Age: 80
End: 2020-09-23
Payer: MEDICARE

## 2020-09-23 VITALS
WEIGHT: 129.19 LBS | SYSTOLIC BLOOD PRESSURE: 128 MMHG | RESPIRATION RATE: 16 BRPM | DIASTOLIC BLOOD PRESSURE: 76 MMHG | BODY MASS INDEX: 19.58 KG/M2 | HEIGHT: 68 IN | HEART RATE: 77 BPM | OXYGEN SATURATION: 96 %

## 2020-09-23 DIAGNOSIS — E78.2 MIXED HYPERLIPIDEMIA: ICD-10-CM

## 2020-09-23 DIAGNOSIS — E03.4 HYPOTHYROIDISM DUE TO ACQUIRED ATROPHY OF THYROID: Primary | ICD-10-CM

## 2020-09-23 PROCEDURE — 99999 PR PBB SHADOW E&M-EST. PATIENT-LVL III: CPT | Mod: PBBFAC,,, | Performed by: INTERNAL MEDICINE

## 2020-09-23 PROCEDURE — G0008 ADMIN INFLUENZA VIRUS VAC: HCPCS | Mod: PBBFAC

## 2020-09-23 PROCEDURE — 99213 OFFICE O/P EST LOW 20 MIN: CPT | Mod: S$PBB | Performed by: INTERNAL MEDICINE

## 2020-09-23 PROCEDURE — 99213 OFFICE O/P EST LOW 20 MIN: CPT | Mod: PBBFAC | Performed by: INTERNAL MEDICINE

## 2020-09-23 PROCEDURE — 99999 FLU VACCINE - QUADRIVALENT - ADJUVANTED: ICD-10-PCS | Mod: PBBFAC,,,

## 2020-09-23 PROCEDURE — 99999 PR STA SHADOW: CPT | Mod: PBBFAC,,, | Performed by: INTERNAL MEDICINE

## 2020-09-23 PROCEDURE — 99999 FLU VACCINE - QUADRIVALENT - ADJUVANTED: CPT | Mod: PBBFAC,,,

## 2020-09-23 PROCEDURE — 99999 PR PBB SHADOW E&M-EST. PATIENT-LVL III: ICD-10-PCS | Mod: PBBFAC,,, | Performed by: INTERNAL MEDICINE

## 2020-09-23 PROCEDURE — 90694 VACC AIIV4 NO PRSRV 0.5ML IM: CPT | Mod: PBBFAC

## 2020-09-23 NOTE — PROGRESS NOTES
"Subjective:       Patient ID: Pawan Cantrell is a 80 y.o. male.    Chief Complaint: Hyperlipidemia (6 month F/U with lab review), Thyroid Problem, and Medication Refill    Pawan Cantrell is a 79 yo man with hypothyroidism and hyperlipidemia who presents for 6mo follow-up.  No acute complaints.  Labs reviewed.  He stopped his lipitor ;will not take it .side effects       Hyperlipidemia  This is a chronic problem. The current episode started more than 1 year ago. The problem is uncontrolled. Recent lipid tests were reviewed and are high. Pertinent negatives include no chest pain or shortness of breath. He is currently on no antihyperlipidemic treatment (i wanted him to take meds; " i rather do it with diet "). The current treatment provides no improvement of lipids.   Thyroid Problem  Presents for follow-up visit. Symptoms include fatigue and weight gain. Patient reports no anxiety, constipation, depressed mood, diaphoresis, diarrhea, heat intolerance, hoarse voice, leg swelling, palpitations or tremors. The symptoms have been improving. His past medical history is significant for hyperlipidemia.   Medication Refill  Associated symptoms include fatigue. Pertinent negatives include no abdominal pain, chest pain, chills, congestion, diaphoresis, fever, numbness, sore throat or vomiting.   Follow-up  Associated symptoms include fatigue. Pertinent negatives include no abdominal pain, chest pain, chills, congestion, diaphoresis, fever, numbness, sore throat or vomiting.     Review of Systems   Constitutional: Positive for fatigue and weight gain. Negative for chills, diaphoresis and fever.   HENT: Negative for congestion, hoarse voice, postnasal drip and sore throat.    Eyes: Negative for photophobia.   Respiratory: Negative for chest tightness and shortness of breath.    Cardiovascular: Negative for chest pain and palpitations.   Gastrointestinal: Negative for abdominal distention, abdominal pain, blood in stool, " constipation, diarrhea and vomiting.   Endocrine: Negative for heat intolerance.   Genitourinary: Positive for frequency. Negative for dysuria, flank pain and hematuria.   Musculoskeletal: Negative for back pain.   Skin: Negative for pallor.   Neurological: Negative for dizziness, tremors, seizures, facial asymmetry, speech difficulty and numbness.   Hematological: Does not bruise/bleed easily.   Psychiatric/Behavioral: Negative for agitation and suicidal ideas. The patient is not nervous/anxious.        Objective:      Physical Exam  Vitals signs and nursing note reviewed.   Constitutional:       Appearance: He is well-developed.   HENT:      Head: Normocephalic and atraumatic.   Neck:      Vascular: No JVD.   Cardiovascular:      Rate and Rhythm: Normal rate and regular rhythm.      Heart sounds: Normal heart sounds.   Pulmonary:      Effort: Pulmonary effort is normal.      Breath sounds: Normal breath sounds.   Abdominal:      General: Bowel sounds are normal.      Palpations: Abdomen is soft.      Tenderness: There is no abdominal tenderness.   Skin:     General: Skin is warm and dry.   Neurological:      Mental Status: He is alert and oriented to person, place, and time.   Psychiatric:         Behavior: Behavior normal.         Thought Content: Thought content normal.         Judgment: Judgment normal.         Assessment:       1. Hypothyroidism due to acquired atrophy of thyroid    2. Mixed hyperlipidemia        Plan:   Pawan was seen today for hyperlipidemia, thyroid problem and medication refill.    Diagnoses and all orders for this visit:    Hypothyroidism due to acquired atrophy of thyroid  -     CBC auto differential; Future  -     Comprehensive metabolic panel; Future  -     Lipid Panel; Future  -     TSH; Future  Well controlled.  Continue same medication and dose.  Mixed hyperlipidemia  -     CBC auto differential; Future  -     Comprehensive metabolic panel; Future  -     Lipid Panel; Future  -      TSH; Future  Limit the cholesterol in your diet to less than 300 mg per day.   Fats should contribute no more than 20 to 35% of your daily calories.   Less than 7 to 10% of your calories should come from saturated fat.   Avoid saturated fat products e.g., Butter, some oils, meat, and poultry fat contain a lot of saturated fat.   Check food labels for fat and cholesterol content. Choose the foods with less fat per serving.   Limit the amount of butter and margarine you eat.   Use salad dressings and margarine made with polyunsaturated and monounsaturated fats.   Use egg whites or egg substitutes rather than whole eggs.   Replace whole-milk dairy products with nonfat or low-fat milk, cheese, spreads, and yogurt.   Eat skinless chicken, turkey, fish, and meatless entrees more often than red meat.   Choose lean cuts of meat and trim off all visible fat. Keep portion sizes moderate.   Avoid fatty desserts such as ice cream, cream-filled cakes, and cheesecakes. Choose fresh fruits, nonfat frozen yogurt, Popsicles, etc.   Reduce the amount of fried foods, vending machine food, and fast food you eat.   Eat fruits and vegetables (especially fresh fruits and leafy vegetables), beans, and whole grains daily. The fiber in these foods helps lower cholesterol.   Look for low-fat or nonfat varieties of the foods you like to eat, or look for substitutes.   You may need to exercise 60 minutes a day to prevent weight gain and 90 minutes a day to lose weight.    Problem List Items Addressed This Visit     Hyperlipidemia    Hypothyroidism due to acquired atrophy of thyroid - Primary

## 2020-12-03 NOTE — PROGRESS NOTES
"Subjective:       Patient ID: Pawan Cantrell is a 78 y.o. male.    Chief Complaint: Hyperlipidemia (follow up); Thyroid Problem; and Medication Refill    Hyperlipidemia   This is a chronic problem. The current episode started more than 1 year ago. The problem is uncontrolled. Recent lipid tests were reviewed and are high. Pertinent negatives include no chest pain or shortness of breath. He is currently on no antihyperlipidemic treatment (i wanted him to take meds; " i rather do it with diet "). The current treatment provides no improvement of lipids.   Thyroid Problem   Presents for follow-up visit. Symptoms include fatigue and weight gain. Patient reports no anxiety, constipation, depressed mood, diaphoresis, diarrhea, heat intolerance, hoarse voice, leg swelling, palpitations or tremors. The symptoms have been improving. His past medical history is significant for hyperlipidemia.     Review of Systems   Constitutional: Positive for fatigue and weight gain. Negative for chills, diaphoresis and fever.   HENT: Negative for congestion, hoarse voice, postnasal drip and sore throat.    Eyes: Negative for photophobia.   Respiratory: Negative for chest tightness and shortness of breath.    Cardiovascular: Negative for chest pain and palpitations.   Gastrointestinal: Negative for abdominal distention, abdominal pain, blood in stool, constipation, diarrhea and vomiting.   Endocrine: Negative for heat intolerance.   Genitourinary: Negative for dysuria, flank pain and hematuria.   Musculoskeletal: Negative for back pain.   Skin: Negative for pallor.   Neurological: Negative for dizziness, tremors, seizures, facial asymmetry, speech difficulty and numbness.   Hematological: Does not bruise/bleed easily.   Psychiatric/Behavioral: Negative for agitation and suicidal ideas. The patient is not nervous/anxious.        Objective:      Physical Exam   Constitutional: He is oriented to person, place, and time. He appears " well-developed and well-nourished.   HENT:   Head: Normocephalic and atraumatic.   Neck: No JVD present.   Cardiovascular: Normal rate, regular rhythm and normal heart sounds.   Pulmonary/Chest: Effort normal and breath sounds normal.   Abdominal: Soft. Bowel sounds are normal. There is no tenderness.   Musculoskeletal: He exhibits no edema.   Neurological: He is alert and oriented to person, place, and time.   Skin: Skin is warm and dry.   Psychiatric: He has a normal mood and affect. His behavior is normal. Judgment and thought content normal.   Nursing note and vitals reviewed.      Assessment:       1. Hypothyroidism due to acquired atrophy of thyroid    2. Immunization due    3. Hyperlipidemia, unspecified hyperlipidemia type        Plan:   Pawan was seen today for hyperlipidemia, thyroid problem and medication refill.    Diagnoses and all orders for this visit:    Hypothyroidism due to acquired atrophy of thyroid  meds refilled.    -     CBC auto differential; Future  -     TSH; Future  Well controlled.  Continue same medication and dose.    Immunization due  -     Pneumococcal Polysaccharide Vaccine (23 Valent) (SQ/IM)    Hyperlipidemia, unspecified hyperlipidemia type  -     CBC auto differential; Future  -     Comprehensive metabolic panel; Future  -     Lipid panel; Future  -     TSH; Future    Limit the cholesterol in your diet to less than 300 mg per day.   Fats should contribute no more than 20 to 35% of your daily calories.   Less than 7 to 10% of your calories should come from saturated fat.   Avoid saturated fat products e.g., Butter, some oils, meat, and poultry fat contain a lot of saturated fat.   Check food labels for fat and cholesterol content. Choose the foods with less fat per serving.   Limit the amount of butter and margarine you eat.   Use salad dressings and margarine made with polyunsaturated and monounsaturated fats.   Use egg whites or egg substitutes rather than whole eggs.   Replace  Airborne+Contact precautions whole-milk dairy products with nonfat or low-fat milk, cheese, spreads, and yogurt.   Eat skinless chicken, turkey, fish, and meatless entrees more often than red meat.   Choose lean cuts of meat and trim off all visible fat. Keep portion sizes moderate.   Avoid fatty desserts such as ice cream, cream-filled cakes, and cheesecakes. Choose fresh fruits, nonfat frozen yogurt, Popsicles, etc.   Reduce the amount of fried foods, vending machine food, and fast food you eat.   Eat fruits and vegetables (especially fresh fruits and leafy vegetables), beans, and whole grains daily. The fiber in these foods helps lower cholesterol.   Look for low-fat or nonfat varieties of the foods you like to eat, or look for substitutes.   You may need to exercise 60 minutes a day to prevent weight gain and 90 minutes a day to lose weight.

## 2021-03-18 ENCOUNTER — LAB VISIT (OUTPATIENT)
Dept: LAB | Facility: HOSPITAL | Age: 81
End: 2021-03-18
Attending: INTERNAL MEDICINE
Payer: MEDICARE

## 2021-03-18 DIAGNOSIS — E03.4 HYPOTHYROIDISM DUE TO ACQUIRED ATROPHY OF THYROID: ICD-10-CM

## 2021-03-18 DIAGNOSIS — E78.2 MIXED HYPERLIPIDEMIA: ICD-10-CM

## 2021-03-18 LAB
ALBUMIN SERPL BCP-MCNC: 3.8 G/DL (ref 3.5–5.2)
ALP SERPL-CCNC: 50 U/L (ref 55–135)
ALT SERPL W/O P-5'-P-CCNC: 16 U/L (ref 10–44)
ANION GAP SERPL CALC-SCNC: 7 MMOL/L (ref 8–16)
AST SERPL-CCNC: 24 U/L (ref 10–40)
BASOPHILS # BLD AUTO: 0.04 K/UL (ref 0–0.2)
BASOPHILS NFR BLD: 0.8 % (ref 0–1.9)
BILIRUB SERPL-MCNC: 0.5 MG/DL (ref 0.1–1)
BUN SERPL-MCNC: 16 MG/DL (ref 8–23)
CALCIUM SERPL-MCNC: 8.9 MG/DL (ref 8.7–10.5)
CHLORIDE SERPL-SCNC: 105 MMOL/L (ref 95–110)
CHOLEST SERPL-MCNC: 212 MG/DL (ref 120–199)
CHOLEST/HDLC SERPL: 4.6 {RATIO} (ref 2–5)
CO2 SERPL-SCNC: 29 MMOL/L (ref 23–29)
CREAT SERPL-MCNC: 1.1 MG/DL (ref 0.5–1.4)
DIFFERENTIAL METHOD: ABNORMAL
EOSINOPHIL # BLD AUTO: 0.2 K/UL (ref 0–0.5)
EOSINOPHIL NFR BLD: 3.7 % (ref 0–8)
ERYTHROCYTE [DISTWIDTH] IN BLOOD BY AUTOMATED COUNT: 13.8 % (ref 11.5–14.5)
EST. GFR  (AFRICAN AMERICAN): >60 ML/MIN/1.73 M^2
EST. GFR  (NON AFRICAN AMERICAN): >60 ML/MIN/1.73 M^2
GLUCOSE SERPL-MCNC: 102 MG/DL (ref 70–110)
HCT VFR BLD AUTO: 48 % (ref 40–54)
HDLC SERPL-MCNC: 46 MG/DL (ref 40–75)
HDLC SERPL: 21.7 % (ref 20–50)
HGB BLD-MCNC: 16.2 G/DL (ref 14–18)
IMM GRANULOCYTES # BLD AUTO: 0.01 K/UL (ref 0–0.04)
IMM GRANULOCYTES NFR BLD AUTO: 0.2 % (ref 0–0.5)
LDLC SERPL CALC-MCNC: 141.6 MG/DL (ref 63–159)
LYMPHOCYTES # BLD AUTO: 1.8 K/UL (ref 1–4.8)
LYMPHOCYTES NFR BLD: 35.3 % (ref 18–48)
MCH RBC QN AUTO: 30.1 PG (ref 27–31)
MCHC RBC AUTO-ENTMCNC: 33.8 G/DL (ref 32–36)
MCV RBC AUTO: 89 FL (ref 82–98)
MONOCYTES # BLD AUTO: 0.4 K/UL (ref 0.3–1)
MONOCYTES NFR BLD: 7.1 % (ref 4–15)
NEUTROPHILS # BLD AUTO: 2.7 K/UL (ref 1.8–7.7)
NEUTROPHILS NFR BLD: 52.9 % (ref 38–73)
NONHDLC SERPL-MCNC: 166 MG/DL
NRBC BLD-RTO: 0 /100 WBC
PLATELET # BLD AUTO: 207 K/UL (ref 150–350)
PMV BLD AUTO: 8.1 FL (ref 9.2–12.9)
POTASSIUM SERPL-SCNC: 4.6 MMOL/L (ref 3.5–5.1)
PROT SERPL-MCNC: 7.3 G/DL (ref 6–8.4)
RBC # BLD AUTO: 5.39 M/UL (ref 4.6–6.2)
SODIUM SERPL-SCNC: 141 MMOL/L (ref 136–145)
TRIGL SERPL-MCNC: 122 MG/DL (ref 30–150)
TSH SERPL DL<=0.005 MIU/L-ACNC: 1 UIU/ML (ref 0.4–4)
WBC # BLD AUTO: 5.1 K/UL (ref 3.9–12.7)

## 2021-03-18 PROCEDURE — 80053 COMPREHEN METABOLIC PANEL: CPT | Performed by: INTERNAL MEDICINE

## 2021-03-18 PROCEDURE — 85025 COMPLETE CBC W/AUTO DIFF WBC: CPT | Performed by: INTERNAL MEDICINE

## 2021-03-18 PROCEDURE — 80061 LIPID PANEL: CPT | Performed by: INTERNAL MEDICINE

## 2021-03-18 PROCEDURE — 36415 COLL VENOUS BLD VENIPUNCTURE: CPT | Performed by: INTERNAL MEDICINE

## 2021-03-18 PROCEDURE — 84443 ASSAY THYROID STIM HORMONE: CPT | Performed by: INTERNAL MEDICINE

## 2021-03-25 ENCOUNTER — OFFICE VISIT (OUTPATIENT)
Dept: INTERNAL MEDICINE | Facility: CLINIC | Age: 81
End: 2021-03-25
Payer: MEDICARE

## 2021-03-25 VITALS
DIASTOLIC BLOOD PRESSURE: 80 MMHG | BODY MASS INDEX: 19.65 KG/M2 | HEIGHT: 68 IN | HEART RATE: 72 BPM | SYSTOLIC BLOOD PRESSURE: 122 MMHG | OXYGEN SATURATION: 97 % | RESPIRATION RATE: 16 BRPM | WEIGHT: 129.63 LBS

## 2021-03-25 DIAGNOSIS — E03.4 HYPOTHYROIDISM DUE TO ACQUIRED ATROPHY OF THYROID: ICD-10-CM

## 2021-03-25 DIAGNOSIS — E78.2 MIXED HYPERLIPIDEMIA: Primary | ICD-10-CM

## 2021-03-25 PROCEDURE — 99999 PR PBB SHADOW E&M-EST. PATIENT-LVL III: CPT | Mod: PBBFAC,,, | Performed by: INTERNAL MEDICINE

## 2021-03-25 PROCEDURE — 99213 OFFICE O/P EST LOW 20 MIN: CPT | Mod: PBBFAC | Performed by: INTERNAL MEDICINE

## 2021-03-25 PROCEDURE — 99999 PR STA SHADOW: CPT | Mod: PBBFAC,,, | Performed by: INTERNAL MEDICINE

## 2021-03-25 PROCEDURE — 99999 PR PBB SHADOW E&M-EST. PATIENT-LVL III: ICD-10-PCS | Mod: PBBFAC,,, | Performed by: INTERNAL MEDICINE

## 2021-03-25 PROCEDURE — 99214 OFFICE O/P EST MOD 30 MIN: CPT | Mod: S$PBB | Performed by: INTERNAL MEDICINE

## 2021-03-25 RX ORDER — LEVOTHYROXINE SODIUM 50 UG/1
50 TABLET ORAL DAILY
Qty: 90 TABLET | Refills: 3 | Status: SHIPPED | OUTPATIENT
Start: 2021-03-25 | End: 2022-03-02

## 2022-02-25 DIAGNOSIS — E03.4 HYPOTHYROIDISM DUE TO ACQUIRED ATROPHY OF THYROID: ICD-10-CM

## 2022-02-25 NOTE — TELEPHONE ENCOUNTER
Care Due:                  Date            Visit Type   Department     Provider  --------------------------------------------------------------------------------                                EP -                              PRIMARY      [facility]  Last Visit: 03-      CARE ([facility])   MEDICINE JAMIL Ward  Next Visit: None Scheduled  None         None Found                                                            Last  Test          Frequency    Reason                     Performed    Due Date  --------------------------------------------------------------------------------    Office Visit  12 months..  levothyroxine............  03- 03-    TSH.........  12 months..  levothyroxine............  03- 03-    Powered by [employer]. Reference number: [id number].   2/25/2022 3:32:31 PM CST

## 2022-03-02 RX ORDER — LEVOTHYROXINE SODIUM 50 UG/1
TABLET ORAL
Qty: 90 TABLET | Refills: 0 | Status: SHIPPED | OUTPATIENT
Start: 2022-03-02 | End: 2022-05-02 | Stop reason: SDUPTHER

## 2022-03-02 NOTE — TELEPHONE ENCOUNTER
Refill Authorization Note   Pawan Cantrell  is requesting a refill authorization.  Brief Assessment and Rationale for Refill:  Approve    -Medication-Related Problems Identified:   Requires labs  Requires appointment  Medication Therapy Plan:       Medication Reconciliation Completed: No   Comments:   --->Care Gap information included below if applicable.   Orders Placed This Encounter    levothyroxine (SYNTHROID) 50 MCG tablet      Requested Prescriptions   Signed Prescriptions Disp Refills    levothyroxine (SYNTHROID) 50 MCG tablet 90 tablet 0     Sig: TAKE 1 TABLET(50 MCG) BY MOUTH EVERY DAY       Endocrinology:  Hypothyroid Agents Passed - 2/25/2022  3:32 PM        Passed - Patient is at least 18 years old        Passed - Valid encounter within last 15 months     Recent Visits  Date Type Provider Dept   03/25/21 Office Visit Macrina Ward MD [facility]   09/23/20 Office Visit Macrina Ward MD [facility]   03/25/20 Office Visit Macrina Ward MD [facility]   Showing recent visits within past 720 days and meeting all other requirements  Future Appointments  No visits were found meeting these conditions.  Showing future appointments within next 150 days and meeting all other requirements                Passed - Manual Review: FT4 is not required if last TSH is WNL.        Passed - TSH in normal range and within 360 days     TSH   Date Value Ref Range Status   03/18/2021 1.003 0.400 - 4.000 uIU/mL Final   09/16/2020 0.592 0.400 - 4.000 uIU/mL Final   03/18/2020 0.517 0.400 - 4.000 uIU/mL Final              Passed - T4 free within 1080 days     Free T4   Date Value Ref Range Status   09/11/2019 1.05 0.71 - 1.51 ng/dL Final   01/03/2017 0.85 0.71 - 1.51 ng/dL Final   12/03/2015 0.74 0.71 - 1.51 ng/dL Final                  Appointments  past 12m or future 3m with PCP    Date Provider   Last Visit   3/25/2021 Macrina Ward MD   Next Visit   Visit date not  found Macrina Ward MD   ED visits in past 90 days: 0     Note composed:9:16 AM 03/02/2022

## 2022-04-20 ENCOUNTER — TELEPHONE (OUTPATIENT)
Dept: INTERNAL MEDICINE | Facility: CLINIC | Age: 82
End: 2022-04-20
Payer: MEDICARE

## 2022-04-20 DIAGNOSIS — E03.4 HYPOTHYROIDISM DUE TO ACQUIRED ATROPHY OF THYROID: Primary | ICD-10-CM

## 2022-04-20 DIAGNOSIS — E78.2 MIXED HYPERLIPIDEMIA: ICD-10-CM

## 2022-04-20 NOTE — TELEPHONE ENCOUNTER
----- Message from Josefina Sandoval sent at 2022 12:23 PM CDT -----  Regarding: Lab Orders  Contact: abilio Cantrell  MRN: [id number]  : 1940  PCP: Macrina Ward  Home Phone      [phone]  Work Phone      Not on file.  Mobile          [phone]      MESSAGE:   Patient request lab orders prior to PCP appointment   PCP appointment -22  Lab appointment - 22    Phone # [phone]    Pharmacy - [id number] - [place] - [address] AT [place] [address]

## 2022-04-26 ENCOUNTER — LAB VISIT (OUTPATIENT)
Dept: LAB | Facility: HOSPITAL | Age: 82
End: 2022-04-26
Attending: INTERNAL MEDICINE
Payer: MEDICARE

## 2022-04-26 DIAGNOSIS — E03.4 HYPOTHYROIDISM DUE TO ACQUIRED ATROPHY OF THYROID: ICD-10-CM

## 2022-04-26 DIAGNOSIS — E78.2 MIXED HYPERLIPIDEMIA: ICD-10-CM

## 2022-04-26 LAB
ALBUMIN SERPL BCP-MCNC: 3.9 G/DL (ref 3.5–5.2)
ALP SERPL-CCNC: 46 U/L (ref 55–135)
ALT SERPL W/O P-5'-P-CCNC: 17 U/L (ref 10–44)
ANION GAP SERPL CALC-SCNC: 9 MMOL/L (ref 8–16)
AST SERPL-CCNC: 23 U/L (ref 10–40)
BASOPHILS # BLD AUTO: 0.04 K/UL (ref 0–0.2)
BASOPHILS NFR BLD: 0.6 % (ref 0–1.9)
BILIRUB SERPL-MCNC: 1 MG/DL (ref 0.1–1)
BUN SERPL-MCNC: 20 MG/DL (ref 8–23)
CALCIUM SERPL-MCNC: 9.2 MG/DL (ref 8.7–10.5)
CHLORIDE SERPL-SCNC: 104 MMOL/L (ref 95–110)
CHOLEST SERPL-MCNC: 190 MG/DL (ref 120–199)
CHOLEST/HDLC SERPL: 3.8 {RATIO} (ref 2–5)
CO2 SERPL-SCNC: 27 MMOL/L (ref 23–29)
CREAT SERPL-MCNC: 1 MG/DL (ref 0.5–1.4)
DIFFERENTIAL METHOD: ABNORMAL
EOSINOPHIL # BLD AUTO: 0.1 K/UL (ref 0–0.5)
EOSINOPHIL NFR BLD: 0.9 % (ref 0–8)
ERYTHROCYTE [DISTWIDTH] IN BLOOD BY AUTOMATED COUNT: 13.6 % (ref 11.5–14.5)
EST. GFR  (AFRICAN AMERICAN): >60 ML/MIN/1.73 M^2
EST. GFR  (NON AFRICAN AMERICAN): >60 ML/MIN/1.73 M^2
GLUCOSE SERPL-MCNC: 96 MG/DL (ref 70–110)
HCT VFR BLD AUTO: 43.7 % (ref 40–54)
HDLC SERPL-MCNC: 50 MG/DL (ref 40–75)
HDLC SERPL: 26.3 % (ref 20–50)
HGB BLD-MCNC: 14.7 G/DL (ref 14–18)
IMM GRANULOCYTES # BLD AUTO: 0.01 K/UL (ref 0–0.04)
IMM GRANULOCYTES NFR BLD AUTO: 0.2 % (ref 0–0.5)
LDLC SERPL CALC-MCNC: 123.8 MG/DL (ref 63–159)
LYMPHOCYTES # BLD AUTO: 1.8 K/UL (ref 1–4.8)
LYMPHOCYTES NFR BLD: 28.2 % (ref 18–48)
MCH RBC QN AUTO: 29.9 PG (ref 27–31)
MCHC RBC AUTO-ENTMCNC: 33.6 G/DL (ref 32–36)
MCV RBC AUTO: 89 FL (ref 82–98)
MONOCYTES # BLD AUTO: 0.6 K/UL (ref 0.3–1)
MONOCYTES NFR BLD: 8.7 % (ref 4–15)
NEUTROPHILS # BLD AUTO: 3.9 K/UL (ref 1.8–7.7)
NEUTROPHILS NFR BLD: 61.4 % (ref 38–73)
NONHDLC SERPL-MCNC: 140 MG/DL
NRBC BLD-RTO: 0 /100 WBC
PLATELET # BLD AUTO: 195 K/UL (ref 150–450)
PMV BLD AUTO: 8.2 FL (ref 9.2–12.9)
POTASSIUM SERPL-SCNC: 4.5 MMOL/L (ref 3.5–5.1)
PROT SERPL-MCNC: 7.2 G/DL (ref 6–8.4)
RBC # BLD AUTO: 4.91 M/UL (ref 4.6–6.2)
SODIUM SERPL-SCNC: 140 MMOL/L (ref 136–145)
TRIGL SERPL-MCNC: 81 MG/DL (ref 30–150)
TSH SERPL DL<=0.005 MIU/L-ACNC: 0.6 UIU/ML (ref 0.4–4)
WBC # BLD AUTO: 6.41 K/UL (ref 3.9–12.7)

## 2022-04-26 PROCEDURE — 80061 LIPID PANEL: CPT | Performed by: INTERNAL MEDICINE

## 2022-04-26 PROCEDURE — 84443 ASSAY THYROID STIM HORMONE: CPT | Performed by: INTERNAL MEDICINE

## 2022-04-26 PROCEDURE — 80053 COMPREHEN METABOLIC PANEL: CPT | Performed by: INTERNAL MEDICINE

## 2022-04-26 PROCEDURE — 36415 COLL VENOUS BLD VENIPUNCTURE: CPT | Performed by: INTERNAL MEDICINE

## 2022-04-26 PROCEDURE — 85025 COMPLETE CBC W/AUTO DIFF WBC: CPT | Performed by: INTERNAL MEDICINE

## 2022-05-02 ENCOUNTER — OFFICE VISIT (OUTPATIENT)
Dept: INTERNAL MEDICINE | Facility: CLINIC | Age: 82
End: 2022-05-02
Payer: MEDICARE

## 2022-05-02 VITALS
OXYGEN SATURATION: 96 % | HEART RATE: 69 BPM | WEIGHT: 120.56 LBS | SYSTOLIC BLOOD PRESSURE: 110 MMHG | DIASTOLIC BLOOD PRESSURE: 70 MMHG | RESPIRATION RATE: 19 BRPM | BODY MASS INDEX: 18.27 KG/M2 | HEIGHT: 68 IN

## 2022-05-02 DIAGNOSIS — E03.4 HYPOTHYROIDISM DUE TO ACQUIRED ATROPHY OF THYROID: ICD-10-CM

## 2022-05-02 DIAGNOSIS — E78.2 MIXED HYPERLIPIDEMIA: Primary | ICD-10-CM

## 2022-05-02 PROCEDURE — 99999 PR STA SHADOW: CPT | Mod: PBBFAC,,, | Performed by: INTERNAL MEDICINE

## 2022-05-02 PROCEDURE — 99214 OFFICE O/P EST MOD 30 MIN: CPT | Mod: S$PBB | Performed by: INTERNAL MEDICINE

## 2022-05-02 PROCEDURE — 99999 PR PBB SHADOW E&M-EST. PATIENT-LVL III: CPT | Mod: PBBFAC,,, | Performed by: INTERNAL MEDICINE

## 2022-05-02 PROCEDURE — 99999 PR STA SHADOW: ICD-10-PCS | Mod: PBBFAC,,, | Performed by: INTERNAL MEDICINE

## 2022-05-02 PROCEDURE — 99213 OFFICE O/P EST LOW 20 MIN: CPT | Mod: PBBFAC | Performed by: INTERNAL MEDICINE

## 2022-05-02 RX ORDER — LEVOTHYROXINE SODIUM 50 UG/1
50 TABLET ORAL DAILY
Qty: 90 TABLET | Refills: 1 | Status: SHIPPED | OUTPATIENT
Start: 2022-05-02 | End: 2022-11-30

## 2022-05-02 NOTE — PROGRESS NOTES
"Subjective:       Patient ID: Pawan Cantrell is a 82 y.o. male.    Chief Complaint: 6 m, Hyperlipidemia, and Thyroid Problem    Pawan Cantrell is a 81 yo man with hypothyroidism and hyperlipidemia who presents for 6mo follow-up.  No acute complaints.  Labs reviewed.  He stopped his lipitor ;will not take it .side effects       Hyperlipidemia  This is a chronic problem. The current episode started more than 1 year ago. The problem is uncontrolled. Recent lipid tests were reviewed and are high. Pertinent negatives include no chest pain or shortness of breath. He is currently on no antihyperlipidemic treatment (i wanted him to take meds; " i rather do it with diet "). The current treatment provides no improvement of lipids.   Thyroid Problem  Presents for follow-up visit. Symptoms include weight gain. Patient reports no anxiety, constipation, depressed mood, diaphoresis, diarrhea, heat intolerance, hoarse voice, leg swelling, palpitations or tremors. The symptoms have been improving. His past medical history is significant for hyperlipidemia.   Medication Refill  Pertinent negatives include no abdominal pain, chest pain, chills, congestion, diaphoresis, fever, numbness, sore throat or vomiting.   Follow-up  Pertinent negatives include no abdominal pain, chest pain, chills, congestion, diaphoresis, fever, numbness, sore throat or vomiting.     Review of Systems   Constitutional: Positive for weight gain. Negative for chills, diaphoresis and fever.   HENT: Negative for congestion, hoarse voice, postnasal drip and sore throat.    Eyes: Negative for photophobia.   Respiratory: Negative for chest tightness and shortness of breath.    Cardiovascular: Negative for chest pain and palpitations.   Gastrointestinal: Negative for abdominal distention, abdominal pain, blood in stool, constipation, diarrhea and vomiting.   Endocrine: Negative for heat intolerance.   Genitourinary: Negative for dysuria, flank pain and hematuria. "   Musculoskeletal: Negative for back pain.   Skin: Negative for pallor.   Neurological: Negative for dizziness, tremors, seizures, facial asymmetry, speech difficulty and numbness.   Hematological: Does not bruise/bleed easily.   Psychiatric/Behavioral: Negative for agitation and suicidal ideas. The patient is not nervous/anxious.        Objective:      Physical Exam  Vitals and nursing note reviewed.   Constitutional:       Appearance: He is well-developed.   HENT:      Head: Normocephalic and atraumatic.      Nose: Nose normal.   Eyes:      Conjunctiva/sclera: Conjunctivae normal.      Pupils: Pupils are equal, round, and reactive to light.   Neck:      Thyroid: No thyromegaly.      Vascular: No JVD.   Cardiovascular:      Rate and Rhythm: Normal rate and regular rhythm.      Heart sounds: Normal heart sounds.   Pulmonary:      Effort: Pulmonary effort is normal.      Breath sounds: Normal breath sounds.   Abdominal:      General: Bowel sounds are normal. There is no distension.      Palpations: Abdomen is soft. There is no mass.      Tenderness: There is no abdominal tenderness. There is no guarding.   Musculoskeletal:         General: Normal range of motion.      Cervical back: Normal range of motion and neck supple.   Lymphadenopathy:      Cervical: No cervical adenopathy.   Skin:     General: Skin is warm and dry.      Coloration: Skin is not pale.      Findings: No rash.   Neurological:      Mental Status: He is alert and oriented to person, place, and time.      Cranial Nerves: No cranial nerve deficit.      Deep Tendon Reflexes: Reflexes are normal and symmetric.         Assessment:       1. Mixed hyperlipidemia    2. Hypothyroidism due to acquired atrophy of thyroid        Plan:   Pawan was seen today for 6 m, hyperlipidemia and thyroid problem.    Diagnoses and all orders for this visit:    Mixed hyperlipidemia  -     Lipid Panel; Future  Limit the cholesterol in your diet to less than 300 mg per day.    Fats should contribute no more than 20 to 35% of your daily calories.   Less than 7 to 10% of your calories should come from saturated fat.   Avoid saturated fat products e.g., Butter, some oils, meat, and poultry fat contain a lot of saturated fat.   Check food labels for fat and cholesterol content. Choose the foods with less fat per serving.   Limit the amount of butter and margarine you eat.   Use salad dressings and margarine made with polyunsaturated and monounsaturated fats.   Use egg whites or egg substitutes rather than whole eggs.   Replace whole-milk dairy products with nonfat or low-fat milk, cheese, spreads, and yogurt.   Eat skinless chicken, turkey, fish, and meatless entrees more often than red meat.   Choose lean cuts of meat and trim off all visible fat. Keep portion sizes moderate.   Avoid fatty desserts such as ice cream, cream-filled cakes, and cheesecakes. Choose fresh fruits, nonfat frozen yogurt, Popsicles, etc.   Reduce the amount of fried foods, vending machine food, and fast food you eat.   Eat fruits and vegetables (especially fresh fruits and leafy vegetables), beans, and whole grains daily. The fiber in these foods helps lower cholesterol.   Look for low-fat or nonfat varieties of the foods you like to eat, or look for substitutes.   You may need to exercise 60 minutes a day to prevent weight gain and 90 minutes a day to lose weight.  Hypothyroidism due to acquired atrophy of thyroid  -     levothyroxine (SYNTHROID) 50 MCG tablet; Take 1 tablet (50 mcg total) by mouth once daily.  -     CBC Auto Differential; Future  -     Comprehensive Metabolic Panel; Future  -     Lipid Panel; Future  -     TSH; Future  Well controlled.  Continue same medication and dose.    Problem List Items Addressed This Visit     Hyperlipidemia - Primary    Hypothyroidism due to acquired atrophy of thyroid    Relevant Medications    levothyroxine (SYNTHROID) 50 MCG tablet

## 2023-03-27 ENCOUNTER — PES CALL (OUTPATIENT)
Dept: ADMINISTRATIVE | Facility: CLINIC | Age: 83
End: 2023-03-27
Payer: MEDICARE

## 2023-07-18 ENCOUNTER — PES CALL (OUTPATIENT)
Dept: ADMINISTRATIVE | Facility: CLINIC | Age: 83
End: 2023-07-18
Payer: MEDICARE

## 2023-08-07 ENCOUNTER — TELEPHONE (OUTPATIENT)
Dept: INTERNAL MEDICINE | Facility: CLINIC | Age: 83
End: 2023-08-07
Payer: MEDICARE

## 2023-08-07 ENCOUNTER — PES CALL (OUTPATIENT)
Dept: ADMINISTRATIVE | Facility: CLINIC | Age: 83
End: 2023-08-07
Payer: MEDICARE

## 2023-08-07 NOTE — TELEPHONE ENCOUNTER
----- Message from Rocío Arteaga sent at 2023 11:23 AM CDT -----  Contact: Valentine/ Sister  Pawan Cantrell  MRN: [id number]  : 1940  PCP: Macrina Ward  Home Phone      [phone]  Work Phone      Not on file.  Mobile          [phone]      MESSAGE:     Pt sister called stating pt is having hip pain and is wanting to be seen or see if we can order an xray as he hurt it doing something. Pt states it is swollen and going down the leg.       Please advise  Valentine  [phone]

## 2023-08-07 NOTE — TELEPHONE ENCOUNTER
Patient has not been seen in over a year. Notified her he needs appt before anything can be done. Scheduled for tm, she stated she may take him to the ER today. She will call back and cancel if so.

## 2023-08-08 ENCOUNTER — OFFICE VISIT (OUTPATIENT)
Dept: INTERNAL MEDICINE | Facility: CLINIC | Age: 83
End: 2023-08-08
Payer: MEDICARE

## 2023-08-08 VITALS
BODY MASS INDEX: 18.18 KG/M2 | HEIGHT: 68 IN | DIASTOLIC BLOOD PRESSURE: 58 MMHG | RESPIRATION RATE: 16 BRPM | SYSTOLIC BLOOD PRESSURE: 102 MMHG | HEART RATE: 76 BPM | OXYGEN SATURATION: 97 % | WEIGHT: 119.94 LBS

## 2023-08-08 DIAGNOSIS — M54.42 ACUTE LEFT-SIDED LOW BACK PAIN WITH LEFT-SIDED SCIATICA: ICD-10-CM

## 2023-08-08 DIAGNOSIS — M25.552 LEFT HIP PAIN: Primary | ICD-10-CM

## 2023-08-08 PROCEDURE — 99999 PR PBB SHADOW E&M-EST. PATIENT-LVL III: CPT | Mod: PBBFAC,,, | Performed by: NURSE PRACTITIONER

## 2023-08-08 PROCEDURE — 99999PBSHW PR PBB SHADOW TECHNICAL ONLY FILED TO HB: ICD-10-PCS | Mod: PBBFAC,,,

## 2023-08-08 PROCEDURE — 99213 OFFICE O/P EST LOW 20 MIN: CPT | Mod: S$PBB | Performed by: NURSE PRACTITIONER

## 2023-08-08 PROCEDURE — 99999 PR STA SHADOW: ICD-10-PCS | Mod: PBBFAC,,,

## 2023-08-08 PROCEDURE — 99999 PR STA SHADOW: ICD-10-PCS | Mod: PBBFAC,,, | Performed by: NURSE PRACTITIONER

## 2023-08-08 PROCEDURE — 99999 PR STA SHADOW: CPT | Mod: PBBFAC,,,

## 2023-08-08 PROCEDURE — 96372 THER/PROPH/DIAG INJ SC/IM: CPT | Mod: PBBFAC

## 2023-08-08 PROCEDURE — 99999PBSHW PR PBB SHADOW TECHNICAL ONLY FILED TO HB: Mod: PBBFAC,,,

## 2023-08-08 PROCEDURE — 99213 OFFICE O/P EST LOW 20 MIN: CPT | Mod: PBBFAC | Performed by: NURSE PRACTITIONER

## 2023-08-08 PROCEDURE — 99999 PR STA SHADOW: CPT | Mod: PBBFAC,,, | Performed by: NURSE PRACTITIONER

## 2023-08-08 RX ORDER — KETOROLAC TROMETHAMINE 30 MG/ML
15 INJECTION, SOLUTION INTRAMUSCULAR; INTRAVENOUS
Status: COMPLETED | OUTPATIENT
Start: 2023-08-08 | End: 2023-08-08

## 2023-08-08 RX ORDER — METHOCARBAMOL 500 MG/1
500 TABLET, FILM COATED ORAL NIGHTLY PRN
Qty: 14 TABLET | Refills: 0 | Status: SHIPPED | OUTPATIENT
Start: 2023-08-08 | End: 2023-08-22

## 2023-08-08 RX ADMIN — KETOROLAC TROMETHAMINE 15 MG: 30 INJECTION, SOLUTION INTRAMUSCULAR; INTRAVENOUS at 03:08

## 2023-08-08 NOTE — PROGRESS NOTES
Subjective:           Patient ID: Pawan Cantrell is a 83 y.o. male.    Chief Complaint: Hip Pain    Pawan Cantrell is a 83 y.o. male hree with left hip pain ; notes that he was outside working and moving some objects  3 days ago then started with left hip and lower back pain. Now having to ambulate with cane. Taking aleve once daily with some relief.     Notes he did have fall a few days ago; not seen at ED; not able to provide details       Hip Pain   The incident occurred 3 to 5 days ago. The incident occurred at home.     Review of Systems   Constitutional: Negative.    HENT:  Negative for congestion, ear pain, sinus pressure, sneezing and sore throat.    Eyes: Negative.    Respiratory:  Negative for cough, chest tightness, shortness of breath and wheezing.    Cardiovascular: Negative.  Negative for chest pain.   Gastrointestinal:  Negative for abdominal pain, blood in stool, constipation, diarrhea, nausea and vomiting.   Genitourinary:  Negative for difficulty urinating, dysuria and flank pain.   Musculoskeletal:  Positive for arthralgias, back pain and gait problem. Negative for joint swelling.   Skin: Negative.    Neurological:  Negative for dizziness, weakness and headaches.   Hematological: Negative.    Psychiatric/Behavioral: Negative.  Negative for behavioral problems and confusion.        Objective:      Physical Exam  Vitals and nursing note reviewed.   Constitutional:       Appearance: He is well-developed. He is not ill-appearing, toxic-appearing or diaphoretic.   HENT:      Head: Normocephalic and atraumatic.      Nose: Nose normal.   Eyes:      Conjunctiva/sclera: Conjunctivae normal.      Pupils: Pupils are equal, round, and reactive to light.   Neck:      Thyroid: No thyromegaly.      Vascular: No JVD.   Cardiovascular:      Rate and Rhythm: Normal rate and regular rhythm.      Heart sounds: Normal heart sounds.   Pulmonary:      Effort: Pulmonary effort is normal.      Breath sounds: Normal  breath sounds.   Abdominal:      General: Bowel sounds are normal. There is no distension.      Palpations: Abdomen is soft. There is no mass.      Tenderness: There is no abdominal tenderness. There is no guarding.   Musculoskeletal:         General: Tenderness present. Normal range of motion.      Cervical back: Normal range of motion and neck supple.      Comments: On exam he notes + SI joint tenderness and pain going in to buttock   No lateral hip tenderness   Negative straight leg    Lymphadenopathy:      Cervical: No cervical adenopathy.   Skin:     General: Skin is warm and dry.      Coloration: Skin is not pale.      Findings: No rash.   Neurological:      Mental Status: He is alert and oriented to person, place, and time.      Cranial Nerves: No cranial nerve deficit.      Deep Tendon Reflexes: Reflexes are normal and symmetric.         Assessment:       1. Left hip pain    2. Acute left-sided low back pain with left-sided sciatica        Plan:   1. Left hip pain    2. Acute left-sided low back pain with left-sided sciatica  -     ketorolac injection 15 mg  -     methocarbamoL (ROBAXIN) 500 MG Tab; Take 1 tablet (500 mg total) by mouth nightly as needed (take as needed for back/hip pain ( muscle relaxer)).  Dispense: 14 tablet; Refill: 0  -     Ambulatory referral/consult to Physical/Occupational Therapy; Future; Expected date: 08/15/2023       Discussed taking aleve with food and plenty of water  Stretching exercises demonstrated   PT discussed f/u with Dr. Ward in 2 weeks if not better,  Consider imaging then

## 2023-11-09 DIAGNOSIS — E03.4 HYPOTHYROIDISM DUE TO ACQUIRED ATROPHY OF THYROID: ICD-10-CM

## 2023-11-09 RX ORDER — LEVOTHYROXINE SODIUM 50 UG/1
50 TABLET ORAL
Qty: 90 TABLET | Refills: 1 | Status: SHIPPED | OUTPATIENT
Start: 2023-11-09

## 2023-11-09 NOTE — TELEPHONE ENCOUNTER
Care Due:                  Date            Visit Type   Department     Provider  --------------------------------------------------------------------------------                                EP -                              PRIMARY      [facility]  Last Visit: 05-      CARE ([facility])   MEDICINE JAMIL Ward  Next Visit: None Scheduled  None         None Found                                                            Last  Test          Frequency    Reason                     Performed    Due Date  --------------------------------------------------------------------------------    Office Visit  15 months..  levothyroxine............  05- 07-    TSH.........  12 months..  levothyroxine............  04- 04-    [facility] Embedded Care Due Messages. Reference number: [id number].   11/09/2023 8:04:17 AM CST

## 2023-12-20 ENCOUNTER — PATIENT OUTREACH (OUTPATIENT)
Dept: ADMINISTRATIVE | Facility: HOSPITAL | Age: 83
End: 2023-12-20
Payer: MEDICARE

## 2023-12-20 NOTE — PROGRESS NOTES
Chart reviewed, immunization record updated.  No new results noted on [facility] or [facility] web site.  Care Everywhere updated.   Patient care coordination note updated.   LOV with PCP 08/08/2023.  Contacted patient to discuss Enhanced Annual Wellness Visit.   Patient scheduled eAWV for 01/10/2024.